# Patient Record
Sex: FEMALE | Race: WHITE | NOT HISPANIC OR LATINO | Employment: FULL TIME | URBAN - METROPOLITAN AREA
[De-identification: names, ages, dates, MRNs, and addresses within clinical notes are randomized per-mention and may not be internally consistent; named-entity substitution may affect disease eponyms.]

---

## 2021-03-02 ENCOUNTER — TRANSFERRED RECORDS (OUTPATIENT)
Dept: MULTI SPECIALTY CLINIC | Facility: CLINIC | Age: 41
End: 2021-03-02

## 2021-03-02 LAB
HPV ABSTRACT: NORMAL
PAP-ABSTRACT: NORMAL

## 2023-08-30 ENCOUNTER — TELEPHONE (OUTPATIENT)
Dept: FAMILY MEDICINE | Facility: CLINIC | Age: 43
End: 2023-08-30
Payer: COMMERCIAL

## 2023-08-30 NOTE — TELEPHONE ENCOUNTER
Reason for Call:  Appointment Request    Patient requesting this type of appt:  Hospital/ED Follow-Up     Requested provider:  Geraldine Browning    Reason patient unable to be scheduled: Not within requested timeframe    When does patient want to be seen/preferred time: 1-2 weeks    Comments: Patient use to be a patient of Geraldine's when she worked at [employer] and she is looking to establish care again. Patient was recently discharged to [place] from [place] due to insurance reason's and was told to follow up with her PcP within a month. Patient can't get in to see Geraldine till January and she is requesting to be worked in sooner    Okay to leave a detailed message?: Yes at Home number on file [phone] (home)    Call taken on 8/30/2023 at 1:25 PM by Mary Davis

## 2023-08-30 NOTE — TELEPHONE ENCOUNTER
Please offer pt any same day appts. OK to let her know I am expecting and will be out of office for Oct-Dec for maternity leave, so my schedule is tight before my due date  Geraldine Good PA-C

## 2023-08-30 NOTE — TELEPHONE ENCOUNTER
LVM regarding below message and advised to connect with care team to schedule utilizing PCP message below-

## 2023-09-01 ENCOUNTER — OFFICE VISIT (OUTPATIENT)
Dept: FAMILY MEDICINE | Facility: CLINIC | Age: 43
End: 2023-09-01
Payer: COMMERCIAL

## 2023-09-01 VITALS
SYSTOLIC BLOOD PRESSURE: 133 MMHG | DIASTOLIC BLOOD PRESSURE: 86 MMHG | TEMPERATURE: 97.2 F | BODY MASS INDEX: 27.68 KG/M2 | RESPIRATION RATE: 16 BRPM | HEART RATE: 74 BPM | HEIGHT: 63 IN | OXYGEN SATURATION: 99 % | WEIGHT: 156.2 LBS

## 2023-09-01 DIAGNOSIS — F33.2 SEVERE EPISODE OF RECURRENT MAJOR DEPRESSIVE DISORDER, WITHOUT PSYCHOTIC FEATURES (H): ICD-10-CM

## 2023-09-01 DIAGNOSIS — F63.3 TRICHOTILLOMANIA: ICD-10-CM

## 2023-09-01 DIAGNOSIS — F10.11 MILD ALCOHOL ABUSE IN EARLY REMISSION: Primary | ICD-10-CM

## 2023-09-01 DIAGNOSIS — D64.9 ANEMIA, UNSPECIFIED TYPE: ICD-10-CM

## 2023-09-01 DIAGNOSIS — L30.9 DERMATITIS: ICD-10-CM

## 2023-09-01 DIAGNOSIS — J30.2 SEASONAL ALLERGIC RHINITIS, UNSPECIFIED TRIGGER: ICD-10-CM

## 2023-09-01 PROBLEM — F41.9 ANXIETY: Status: ACTIVE | Noted: 2021-03-11

## 2023-09-01 PROBLEM — R79.89 ELEVATED LIVER FUNCTION TESTS: Status: ACTIVE | Noted: 2021-03-11

## 2023-09-01 PROBLEM — E78.00 HYPERCHOLESTEROLEMIA: Status: ACTIVE | Noted: 2021-03-11

## 2023-09-01 PROBLEM — R03.0 ELEVATED BLOOD PRESSURE READING WITHOUT DIAGNOSIS OF HYPERTENSION: Status: ACTIVE | Noted: 2021-03-11

## 2023-09-01 LAB
ALBUMIN SERPL BCG-MCNC: 4.2 G/DL (ref 3.5–5.2)
ALP SERPL-CCNC: 43 U/L (ref 35–104)
ALT SERPL W P-5'-P-CCNC: 64 U/L (ref 0–50)
AST SERPL W P-5'-P-CCNC: 80 U/L (ref 0–45)
BILIRUB DIRECT SERPL-MCNC: <0.2 MG/DL (ref 0–0.3)
BILIRUB SERPL-MCNC: 0.2 MG/DL
ERYTHROCYTE [DISTWIDTH] IN BLOOD BY AUTOMATED COUNT: 18 % (ref 10–15)
FERRITIN SERPL-MCNC: 15 NG/ML (ref 6–175)
HCT VFR BLD AUTO: 33 % (ref 35–47)
HGB BLD-MCNC: 9.5 G/DL (ref 11.7–15.7)
IRON BINDING CAPACITY (ROCHE): 387 UG/DL (ref 240–430)
IRON SATN MFR SERPL: 3 % (ref 15–46)
IRON SERPL-MCNC: 12 UG/DL (ref 37–145)
MCH RBC QN AUTO: 20 PG (ref 26.5–33)
MCHC RBC AUTO-ENTMCNC: 28.8 G/DL (ref 31.5–36.5)
MCV RBC AUTO: 69 FL (ref 78–100)
PLATELET # BLD AUTO: 500 10E3/UL (ref 150–450)
PROT SERPL-MCNC: 7.4 G/DL (ref 6.4–8.3)
RBC # BLD AUTO: 4.76 10E6/UL (ref 3.8–5.2)
WBC # BLD AUTO: 6.2 10E3/UL (ref 4–11)

## 2023-09-01 PROCEDURE — 80076 HEPATIC FUNCTION PANEL: CPT | Performed by: PHYSICIAN ASSISTANT

## 2023-09-01 PROCEDURE — 82728 ASSAY OF FERRITIN: CPT | Performed by: PHYSICIAN ASSISTANT

## 2023-09-01 PROCEDURE — 83550 IRON BINDING TEST: CPT | Performed by: PHYSICIAN ASSISTANT

## 2023-09-01 PROCEDURE — 36415 COLL VENOUS BLD VENIPUNCTURE: CPT | Performed by: PHYSICIAN ASSISTANT

## 2023-09-01 PROCEDURE — 99214 OFFICE O/P EST MOD 30 MIN: CPT | Performed by: PHYSICIAN ASSISTANT

## 2023-09-01 PROCEDURE — 85027 COMPLETE CBC AUTOMATED: CPT | Performed by: PHYSICIAN ASSISTANT

## 2023-09-01 PROCEDURE — 83540 ASSAY OF IRON: CPT | Performed by: PHYSICIAN ASSISTANT

## 2023-09-01 RX ORDER — NALTREXONE HYDROCHLORIDE 50 MG/1
50 TABLET, FILM COATED ORAL DAILY
Qty: 90 TABLET | Refills: 3 | Status: SHIPPED | OUTPATIENT
Start: 2023-09-01 | End: 2024-08-22

## 2023-09-01 RX ORDER — FLUOXETINE 10 MG/1
CAPSULE ORAL
Qty: 49 CAPSULE | Refills: 0 | Status: SHIPPED | OUTPATIENT
Start: 2023-09-01 | End: 2023-12-15

## 2023-09-01 RX ORDER — ARIPIPRAZOLE 2 MG/1
1 TABLET ORAL DAILY
COMMUNITY
Start: 2023-03-14 | End: 2023-09-01

## 2023-09-01 RX ORDER — ARIPIPRAZOLE 2 MG/1
2 TABLET ORAL DAILY
Qty: 90 TABLET | Refills: 1 | Status: SHIPPED | OUTPATIENT
Start: 2023-09-01

## 2023-09-01 RX ORDER — TRETINOIN 0.1 MG/G
GEL TOPICAL
COMMUNITY
Start: 2022-11-09 | End: 2023-09-01

## 2023-09-01 RX ORDER — NALTREXONE HYDROCHLORIDE 50 MG/1
50 TABLET, FILM COATED ORAL
COMMUNITY
Start: 2023-03-10 | End: 2023-09-01

## 2023-09-01 RX ORDER — TRIAMCINOLONE ACETONIDE 1 MG/G
OINTMENT TOPICAL 2 TIMES DAILY
Qty: 80 G | Refills: 3 | Status: SHIPPED | OUTPATIENT
Start: 2023-09-01

## 2023-09-01 RX ORDER — TRETINOIN 0.1 MG/G
GEL TOPICAL AT BEDTIME
Qty: 45 G | Refills: 4 | Status: SHIPPED | OUTPATIENT
Start: 2023-09-01 | End: 2023-12-15

## 2023-09-01 RX ORDER — TRIAMCINOLONE ACETONIDE 1 MG/G
OINTMENT TOPICAL
COMMUNITY
Start: 2022-08-26 | End: 2023-09-01

## 2023-09-01 RX ORDER — TRAZODONE HYDROCHLORIDE 50 MG/1
50 TABLET, FILM COATED ORAL
Qty: 90 TABLET | Refills: 1 | Status: SHIPPED | OUTPATIENT
Start: 2023-09-01

## 2023-09-01 RX ORDER — TRAZODONE HYDROCHLORIDE 50 MG/1
1 TABLET, FILM COATED ORAL
COMMUNITY
Start: 2022-04-21 | End: 2023-09-01

## 2023-09-01 RX ORDER — CETIRIZINE HYDROCHLORIDE 10 MG/1
10 TABLET ORAL DAILY
COMMUNITY
Start: 2022-06-30 | End: 2023-09-01

## 2023-09-01 RX ORDER — CETIRIZINE HYDROCHLORIDE 10 MG/1
10 TABLET ORAL DAILY
Qty: 90 TABLET | Refills: 3 | Status: SHIPPED | OUTPATIENT
Start: 2023-09-01 | End: 2023-12-15

## 2023-09-01 ASSESSMENT — PAIN SCALES - GENERAL: PAINLEVEL: NO PAIN (0)

## 2023-09-01 NOTE — PATIENT INSTRUCTIONS
Week 1: take 25mg of sertraline (1/2 tablet), and take 1 capsule fluoxetine  Week 2: stop sertraline and increase fluoxetine to 2 capsules/day

## 2023-09-01 NOTE — PROGRESS NOTES
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 She will continue with IOP.  - REVIEW OF HEALTH MAINTENANCE PROTOCOL ORDERS  - ARIPiprazole (ABILIFY) 2 MG tablet  Dispense: 90 tablet; Refill: 1  - naltrexone (DEPADE/REVIA) 50 MG tablet  Dispense: 90 tablet; Refill: 3  - Hepatic panel (Albumin, ALT, AST, Bili, Alk Phos, TP)  - Hepatic panel (Albumin, ALT, AST, Bili, Alk Phos, TP)    Severe episode of recurrent major depressive disorder, without psychotic features (H) - stable, meds refilled. She has upcoming appt with psychiatry 9/21, so they will take over prescribing. We will d/c sertraline gradually - decrease to 25mg for 1 week then stop all together week two. Same time we will swtich to fluoxetine, start week 1 with 10mg daily then increase to 20mg daily.   - ARIPiprazole (ABILIFY) 2 MG tablet  Dispense: 90 tablet; Refill: 1  - traZODone (DESYREL) 50 MG tablet  Dispense: 90 tablet; Refill: 1  - FLUoxetine (PROZAC) 10 MG capsule  Dispense: 49 capsule; Refill: 0    Trichotillomania - refills provided  - tretinoin (RETIN-A) 0.01 % external gel  Dispense: 45 g; Refill: 4    Seasonal allergic rhinitis, unspecified trigger - refills provided  - cetirizine (ZYRTEC) 10 MG tablet  Dispense: 90 tablet; Refill: 3    Dermatitis - refills provided  - triamcinolone (KENALOG) 0.1 % external ointment  Dispense: 80 g; Refill: 3    Anemia, unspecified type - recheck to see if ongoing need for iron supplement  - CBC with platelets  - Iron and iron binding capacity  - Ferritin  - CBC with platelets  - Iron and iron binding capacity  - Ferritin    MED REC REQUIRED  Post Medication Reconciliation Status:  Discharge medications reconciled and changed, see notes/orders    This note was generated with [employer], and has been reviewed personally for accuracy. Some pronoun/word replacements may be made unintentionally.     Geraldine Good PA-C  [facility]  "[facility]      Subjective   Priscilla is a 42 year old, presenting for the following health issues:  Follow Up (Blood work /Medication )      9/1/2023     9:57 AM   Additional Questions   Roomed by Dayna SPRING   Accompanied by self     Priscilla is a well known patient to me, previously followed at [facility]    She is following up/reestablishing care following inpatient treatment at [facility]. Admitted on 8/14, discharged on 8/29. She was not able to see a psychiatrist until the day before her discharge. Her psychiatrist restarted Abilify 2mg. Psychiatry recommended switch from sertraline to fluoxetine but did not make the change while inpatient - she has outpt follow up on 9/21. She is not taking trazodone, but she still has some in case she needs it. She has started taking the naltrexone 50 mg again. She is on a 30-day regimen of iron right now because her hemoglobin was really low. She is on Retin-A for scarring. It has been helping.     She is doing evening [employer] 4 nights a week now. She works from 6:00 AM to 2:30 PM or 3:00 PM. She will have a couple of hours when she gets home to hang out with a friend, take her dog for a walk, or go for a bike ride. By the time she goes to [facility], the liquor stores are closed. She is excited when they told her to get a free breathalyzer while she is in treatment. Her biggest triggers are isolation, boredom, and loneliness.       HPI       Review of Systems         Objective    /86 (BP Location: Right arm, Patient Position: Sitting, Cuff Size: Adult Regular)   Pulse 74   Temp 97.2  F (36.2  C) (Temporal)   Resp 16   Ht 1.6 m (5' 3\")   Wt 70.9 kg (156 lb 3.2 oz)   LMP 08/11/2023 (Approximate)   SpO2 99%   BMI 27.67 kg/m    Body mass index is 27.67 kg/m .  Physical Exam   GENERAL: healthy, alert and no distress  PSYCH: mentation appears normal, affect normal/bright      "

## 2023-09-01 NOTE — Clinical Note
Other Tests found in the patient's chart through Chart Review/Care Everywhere:  Pap smear done by [facility] on this date: 3/2/21  Note to Abstraction: If this section is blank, no results were found via Chart Review/Care Everywhere.

## 2023-09-27 ENCOUNTER — LAB (OUTPATIENT)
Dept: LAB | Facility: CLINIC | Age: 43
End: 2023-09-27
Payer: COMMERCIAL

## 2023-09-27 DIAGNOSIS — D64.9 ANEMIA, UNSPECIFIED TYPE: ICD-10-CM

## 2023-09-27 DIAGNOSIS — F10.11 MILD ALCOHOL ABUSE IN EARLY REMISSION: ICD-10-CM

## 2023-09-27 LAB
ALBUMIN SERPL BCG-MCNC: 4.2 G/DL (ref 3.5–5.2)
ALP SERPL-CCNC: 46 U/L (ref 35–104)
ALT SERPL W P-5'-P-CCNC: 20 U/L (ref 0–50)
AST SERPL W P-5'-P-CCNC: 33 U/L (ref 0–45)
BILIRUB DIRECT SERPL-MCNC: <0.2 MG/DL (ref 0–0.3)
BILIRUB SERPL-MCNC: 0.2 MG/DL
ERYTHROCYTE [DISTWIDTH] IN BLOOD BY AUTOMATED COUNT: 17.7 % (ref 10–15)
HCT VFR BLD AUTO: 32.1 % (ref 35–47)
HGB BLD-MCNC: 9.2 G/DL (ref 11.7–15.7)
MCH RBC QN AUTO: 19.5 PG (ref 26.5–33)
MCHC RBC AUTO-ENTMCNC: 28.7 G/DL (ref 31.5–36.5)
MCV RBC AUTO: 68 FL (ref 78–100)
PLATELET # BLD AUTO: 330 10E3/UL (ref 150–450)
PROT SERPL-MCNC: 7.3 G/DL (ref 6.4–8.3)
RBC # BLD AUTO: 4.72 10E6/UL (ref 3.8–5.2)
WBC # BLD AUTO: 6.3 10E3/UL (ref 4–11)

## 2023-09-27 PROCEDURE — 85027 COMPLETE CBC AUTOMATED: CPT

## 2023-09-27 PROCEDURE — 80076 HEPATIC FUNCTION PANEL: CPT

## 2023-09-27 PROCEDURE — 36415 COLL VENOUS BLD VENIPUNCTURE: CPT

## 2023-09-29 ENCOUNTER — MYC MEDICAL ADVICE (OUTPATIENT)
Dept: FAMILY MEDICINE | Facility: CLINIC | Age: 43
End: 2023-09-29
Payer: COMMERCIAL

## 2023-09-29 DIAGNOSIS — D50.0 IRON DEFICIENCY ANEMIA DUE TO CHRONIC BLOOD LOSS: Primary | ICD-10-CM

## 2023-09-29 DIAGNOSIS — K64.9 BLEEDING HEMORRHOIDS: ICD-10-CM

## 2023-09-29 DIAGNOSIS — F63.3 TRICHOTILLOMANIA: ICD-10-CM

## 2023-09-29 NOTE — TELEPHONE ENCOUNTER
Geraldine,    Patient states has been taking 325 mg iron every other day and has missed only a day or two. Does note bright red blood in stool as well as very light periods.    Litzy Gonzalez, RN, BSN, PHN  [facility]  [phone]

## 2023-10-03 RX ORDER — HYDROCORTISONE ACETATE 25 MG/1
25 SUPPOSITORY RECTAL 2 TIMES DAILY
Qty: 60 SUPPOSITORY | Refills: 0 | Status: SHIPPED | OUTPATIENT
Start: 2023-10-03 | End: 2023-12-15

## 2023-10-10 RX ORDER — HYDROCORTISONE 25 MG/G
CREAM TOPICAL 2 TIMES DAILY PRN
Status: CANCELLED | OUTPATIENT
Start: 2023-10-10

## 2023-10-19 ENCOUNTER — MYC REFILL (OUTPATIENT)
Dept: FAMILY MEDICINE | Facility: CLINIC | Age: 43
End: 2023-10-19
Payer: COMMERCIAL

## 2023-10-19 DIAGNOSIS — F33.2 SEVERE EPISODE OF RECURRENT MAJOR DEPRESSIVE DISORDER, WITHOUT PSYCHOTIC FEATURES (H): ICD-10-CM

## 2023-10-19 RX ORDER — FLUOXETINE 10 MG/1
CAPSULE ORAL
Qty: 49 CAPSULE | Refills: 0 | Status: CANCELLED | OUTPATIENT
Start: 2023-10-19 | End: 2023-11-15

## 2023-10-20 NOTE — TELEPHONE ENCOUNTER
Per note from concepcion in her pcp in sept noted Severe episode of recurrent major depressive disorder, without psychotic features (H) - stable, meds refilled. She has upcoming appt with psychiatry 9/21, so they will take over prescribing. We will d/c sertraline gradually - decrease to 25mg for 1 week then stop all together week two. Same time we will swtich to fluoxetine, start week 1 with 10mg daily then increase to 20mg daily.    Please have her discuss all her mental helath meds now with her psychiatrist per pcp they were to take over prescribing

## 2023-10-27 ENCOUNTER — IMMUNIZATION (OUTPATIENT)
Dept: FAMILY MEDICINE | Facility: CLINIC | Age: 43
End: 2023-10-27
Payer: COMMERCIAL

## 2023-10-27 ENCOUNTER — LAB (OUTPATIENT)
Dept: LAB | Facility: CLINIC | Age: 43
End: 2023-10-27
Payer: COMMERCIAL

## 2023-10-27 DIAGNOSIS — D50.0 IRON DEFICIENCY ANEMIA DUE TO CHRONIC BLOOD LOSS: ICD-10-CM

## 2023-10-27 LAB
ERYTHROCYTE [DISTWIDTH] IN BLOOD BY AUTOMATED COUNT: 22.5 % (ref 10–15)
HCT VFR BLD AUTO: 37.1 % (ref 35–47)
HGB BLD-MCNC: 10.6 G/DL (ref 11.7–15.7)
MCH RBC QN AUTO: 20.8 PG (ref 26.5–33)
MCHC RBC AUTO-ENTMCNC: 28.6 G/DL (ref 31.5–36.5)
MCV RBC AUTO: 73 FL (ref 78–100)
PLATELET # BLD AUTO: 323 10E3/UL (ref 150–450)
RBC # BLD AUTO: 5.09 10E6/UL (ref 3.8–5.2)
WBC # BLD AUTO: 7.2 10E3/UL (ref 4–11)

## 2023-10-27 PROCEDURE — 36415 COLL VENOUS BLD VENIPUNCTURE: CPT

## 2023-10-27 PROCEDURE — 90471 IMMUNIZATION ADMIN: CPT

## 2023-10-27 PROCEDURE — 90686 IIV4 VACC NO PRSV 0.5 ML IM: CPT

## 2023-10-27 PROCEDURE — 85027 COMPLETE CBC AUTOMATED: CPT

## 2023-11-05 ENCOUNTER — HEALTH MAINTENANCE LETTER (OUTPATIENT)
Age: 43
End: 2023-11-05

## 2023-12-08 ASSESSMENT — ENCOUNTER SYMPTOMS
NAUSEA: 1
SHORTNESS OF BREATH: 0
FEVER: 0
NERVOUS/ANXIOUS: 1
EYE PAIN: 0
DIZZINESS: 1
HEADACHES: 0
HEMATURIA: 0
PALPITATIONS: 0
COUGH: 0
ABDOMINAL PAIN: 1
PARESTHESIAS: 1
FREQUENCY: 0
MYALGIAS: 1
HEMATOCHEZIA: 1
DYSURIA: 0
HEARTBURN: 0
BREAST MASS: 0
DIARRHEA: 0
CHILLS: 0
ARTHRALGIAS: 0
SORE THROAT: 0
WEAKNESS: 0
CONSTIPATION: 0
JOINT SWELLING: 0

## 2023-12-08 NOTE — COMMUNITY RESOURCES LIST (ENGLISH)
12/08/2023   [facility]  N/A  For questions about this resource list or additional care needs, please contact your primary care clinic or care manager.  Phone: [phone]   Email: N/A   Address: [address] [place] [address]   Hours: N/A        Financial Stability       Rent and mortgage payment assistance  1  [facility] Distance: 0.18 miles      Phone/Virtual   [address] [place] [address]  Language: English  Hours: Mon - Fri 9:00 AM - 4:00 PM  Fees: Free   Phone: (166) 467-8835 Email: johnathon@[facility] HospitalSymtext.n1health Website: http://Mediameeting.org/[facility]/rz-gooi-dwuzmkendalle/     2  ong [employer] ([facility]) - [place] Office - Supportive Housing Assistance Program (SHAP) - Rent and mortgage payment assistance Distance: 0.27 miles      Phone/Virtual   [address] [place] [address]  Language: English, Hmong, Isis, Yemeni  Hours: Mon - Fri 8:30 AM - 5:00 PM  Fees: Free   Phone: (499) 253-5035 Email: shayy@Roomtag.n1health Website: http://www.Roomtag.org/[facility]-impact-areas/     Utility payment assistance  3  [facility] Distance: 0.18 miles      Phone/Virtual   [address] [place] [address]  Language: English  Hours: Mon - Fri 9:00 AM - 4:00 PM  Fees: Free   Phone: (698) 997-2613 Email: johnathon@[facility] Lake PointeCaseReader.n1health Website: http://Mediameeting.org/[facility]/qc-zdkx-fwcqcpepe/     4  ong [employer] ([facility]) - [place] Office - Supportive Housing Assistance Program (SHAP) - Utility payment assistance Distance: 0.27 miles      Phone/Virtual   [address] [place] [address]  Language: English, Hmong, Isis, Yemeni  Hours: Mon - Fri 8:30 AM - 5:00 PM  Fees: Free   Phone: (558) 353-4668 Email: shayy@Roomtag.org Website: http://www.hmong.org/hap-impact-areas/          Food and Nutrition       Food pantry  5  [facility]  [facility] Distance: 0.18 miles      [facility]   [address] [place] [address]  Language: English  Hours: Mon - Tue 9:00 AM - 3:00 PM  Fees: Free, Self Pay   Phone: (839) 406-6695 Email: johnathon@[facility] Website: http://[facility].org/[facility]/     6  [facility] Distance: 0.24 miles      Pickup   [address] [place] [address]  Language: English  Hours: Mon - Fri 12:00 PM - 1:00 PM  Fees: Free   Phone: (667) 962-2742 Email: info@[facility].[employer] Website: https://www.[facility].org/locations/[facility]_ymca     SNAP application assistance  7  [facility] Distance: 0.18 miles      Phone/Virtual   [address] [place] [address]  Language: English  Hours: Mon - Thu 9:00 AM - 4:00 PM , Fri 9:00 AM - 2:00 PM , Sun 10:00 AM - 12:00 PM  Fees: Free   Phone: (366) 644-4252 Email: johnathon@[facility] Website: http://[facility].org/[facility]/     8  [employer] ([facility]greenHealth Medical Center Distance: 0.63 miles      In-Person   [address] [place] [address]  Language: English, Yakut  Hours: Mon - Fri 8:30 AM - 5:00 PM  Fees: Free   Phone: (374) 911-7093 Email: info@[employer].org Website: http://www.[employer].org     Soup kitchen or free meals  9  [facility] Distance: 0.18 miles      [place] [id number]  Language: English  Hours: Mon - Fri 11:45 AM - 12:45 PM  Fees: Free   Phone: (479) 499-8282 Email: johnathon@[facility].salvationarmy.org Website: http://Adams-Nervine Asylumynorth.org/community/iu-wnsx-rnrch-sally/     10  [facility] - Loaves and Fishes Distance: 1.98 miles      [facility]   [address] [place] [address]  Language: English  Hours: Wed 5:30 PM - 6:30 PM  Fees: Free   Phone: (216) 819-4851 Email:  office@orFreeman Cancer Institute.org Website: https://www.[facility].org          Transportation       Free or low-cost transportation  11  [employer] - [place] Circulator Bus Distance: 5.16 miles      In-Person   [address] [place] [address]  Language: English  Hours: Tue 9:00 AM - 2:00 PM  Fees: Self Pay   Phone: (812) 978-3917 Email: info@Soocial Website: http://www.YouFastUnlockorg/     12  [employer] Bus Loop - Free or low-cost transportation Distance: 5.96 miles      In-Person   [address] [place] [address]  Language: English  Hours: Mon - Fri 9:00 AM - 5:00 PM  Fees: Free   Phone: (427) 722-1937 Email: info@Eye-Q Website: https://www.Eye-Q/     Transportation to medical appointments  13  [employer] [facility] Transportation - Non-Emergency Medical Transportation Distance: 2.72 miles      In-Person   [address] [place] [address]  Language: English  Hours: Mon - Fri 8:00 AM - 4:00 PM Appt. Only  Fees: Self Pay   Phone: (487) 648-9226 Website: http://www.allHD Trade Services.org/Medical-Services/Emergency-medical-services/Non-emergency-transportation/     14  [employer] Ride Distance: 3.31 miles      In-Person   [address] [address]  Language: English  Hours: Mon - Thu 6:00 AM - 6:00 PM , Fri 6:00 AM - 5:00 PM  Fees: Insurance, Self Pay   Phone: (921) 311-2674 Email: office@Bellmetric Website: https://www.Bellmetric/          Important Numbers & Websites       Emergency Services   911  City Services   311  Poison Control   (351) 773-7697  Suicide Prevention Lifeline   (798) 100-7527 (TALK)  Child Abuse Hotline   (902) 994-8932 (4-A-Child)  Sexual Assault Hotline   (775) 879-9059 (HOPE)  National Runaway Safeline   (546) 483-5656 (RUNAWAY)  All-Options Talkline   (869) 507-3547  Substance Abuse Referral   (460) 310-9158 (HELP)

## 2023-12-14 ASSESSMENT — PATIENT HEALTH QUESTIONNAIRE - PHQ9
SUM OF ALL RESPONSES TO PHQ QUESTIONS 1-9: 3
SUM OF ALL RESPONSES TO PHQ QUESTIONS 1-9: 3
10. IF YOU CHECKED OFF ANY PROBLEMS, HOW DIFFICULT HAVE THESE PROBLEMS MADE IT FOR YOU TO DO YOUR WORK, TAKE CARE OF THINGS AT HOME, OR GET ALONG WITH OTHER PEOPLE: SOMEWHAT DIFFICULT

## 2023-12-14 NOTE — COMMUNITY RESOURCES LIST (ENGLISH)
12/14/2023   [facility]  N/A  For questions about this resource list or additional care needs, please contact your primary care clinic or care manager.  Phone: [phone]   Email: N/A   Address: [address] [place] [address]   Hours: N/A        Financial Stability       Rent and mortgage payment assistance  1  [facility] Distance: 0.18 miles      Phone/Virtual   [address] [place] [address]  Language: English  Hours: Mon - Fri 9:00 AM - 4:00 PM  Fees: Free   Phone: (969) 220-8117 Email: johnathon@[facility] HospitalSolar Nation.[employer] Website: http://Summay.org/[facility]/cm-iuwk-dbocmkendalle/     2  ong American Partnership ([facility]) - [place] Office - Supportive Housing Assistance Program (SHAP) - Rent and mortgage payment assistance Distance: 0.27 miles      Phone/Virtual   [address] [place] [address]  Language: English, Hmong, Isis, Welsh  Hours: Mon - Fri 8:30 AM - 5:00 PM  Fees: Free   Phone: (672) 352-1914 Email: shayy@[employer] Website: http://www.Kleen Extreme.org/[facility]-impact-areas/     Utility payment assistance  3  [facility] Distance: 0.18 miles      Phone/Virtual   [address] [place] [address]  Language: English  Hours: Mon - Fri 9:00 AM - 4:00 PM  Fees: Free   Phone: (744) 302-6559 Email: johnathon@[facility]'s Medical CenterMela Artisans.Bolt HR Website: http://Summay.org/[facility]/tg-bkiw-iouhgpepe/     4  ong [employer] ([facility]) - [place] Office - Supportive Housing Assistance Program (SHAP) - Utility payment assistance Distance: 0.27 miles      Phone/Virtual   [address] [place] [address]  Language: English, Hmong, Isis, Welsh  Hours: Mon - Fri 8:30 AM - 5:00 PM  Fees: Free   Phone: (828) 517-6847 Email: shayy@K[employer] Extreme.org Website: http://www.hmong.org/hap-impact-areas/          Food and Nutrition       Food pantry  5  [facility]  [facility] Distance: 0.18 miles      [facility]   [address] [place] [address]  Language: English  Hours: Mon - Tue 9:00 AM - 3:00 PM  Fees: Free, Self Pay   Phone: (404) 656-5474 Email: johnathon@[facility] Website: http://[facility].org/[facility]/     6  [facility] the [facility] Distance: 0.24 miles      Pickup   [address] [place] [address]  Language: English  Hours: Mon - Fri 12:00 PM - 1:00 PM  Fees: Free   Phone: (485) 403-2003 Email: info@[facility].[employer] Website: https://www.[facility].org/locations/[facility]_ymca     SNAP application assistance  7  [facility] Distance: 0.18 miles      Phone/Virtual   [address] [place] [address]  Language: English  Hours: Mon - Thu 9:00 AM - 4:00 PM , Fri 9:00 AM - 2:00 PM , Sun 10:00 AM - 12:00 PM  Fees: Free   Phone: (610) 719-8067 Email: johnathon@[facility] Website: http://[facility].org/[facility]/     8  Comunidades Latinas Unidas En Servicio ([facility] Distance: 0.63 miles      In-Person   [address] [place] [address]  Language: English, Yi  Hours: Mon - Fri 8:30 AM - 5:00 PM  Fees: Free   Phone: (384) 503-9361 Email: info@[employer].org Website: http://www.[employer]N Systems.org     Soup kitchen or free meals  9  [facility] Distance: 0.18 miles      [place] [id number]  Language: English  Hours: Mon - Fri 11:45 AM - 12:45 PM  Fees: Free   Phone: (648) 699-5079 Email: johnathon@[facility].salvationarmy.org Website: http://[facility] Tub[facility] Hospitalynorth.org/community/il-gioe-cfoij-sally/     10  [facility] Andrew ScientologistFirstHealth [facility] - Loaves and Fishes - Loaves and Fishes Distance: 1.98 miles      [facility]   [address] [place] [address]  Language: English  Hours: Wed 5:30 PM - 6:30 PM  Fees: Free   Phone: (111) 520-3923 Email:  office@orThe Rehabilitation Institute.org Website: https://www.[facility].org          Transportation       Free or low-cost transportation  11  [employer] - [place] Circulator Bus Distance: 5.16 miles      In-Person   [address] [place] [address]  Language: English  Hours: Tue 9:00 AM - 2:00 PM  Fees: Self Pay   Phone: (994) 103-5329 Email: info@[employer] Website: http://www.Paper.liorg/     12  [employer] Bus Loop - Free or low-cost transportation Distance: 5.96 miles      In-Person   [address] [place] [address]  Language: English  Hours: Mon - Fri 9:00 AM - 5:00 PM  Fees: Free   Phone: (574) 956-1733 Email: info@Ontela Website: https://www.Ontela/     Transportation to medical appointments  13  [employer] Medical Transportation - Non-Emergency Medical Transportation Distance: 2.72 miles      In-Person   [address] [place] [address]  Language: English  Hours: Mon - Fri 8:00 AM - 4:00 PM Appt. Only  Fees: Self Pay   Phone: (704) 549-5254 Website: http://www.allEnchanted Diamonds.org/Medical-Services/Emergency-medical-services/Non-emergency-transportation/     14  [employer] Ride Distance: 3.31 miles      In-Person   [address] [address]  Language: English  Hours: Mon - Thu 6:00 AM - 6:00 PM , Fri 6:00 AM - 5:00 PM  Fees: Insurance, Self Pay   Phone: (226) 681-2531 Email: office@Cellartis Website: https://www.Cellartis/          Important Numbers & Websites       Emergency Services   911  City Services   311  Poison Control   (287) 472-2885  Suicide Prevention Lifeline   (194) 397-3805 (TALK)  Child Abuse Hotline   (469) 998-6896 (4-A-Child)  Sexual Assault Hotline   (532) 858-3520 (HOPE)  National Runaway Safeline   (404) 392-7347 (RUNAWAY)  All-Options Talkline   (491) 462-3260  Substance Abuse Referral   (797) 353-5891 (HELP)

## 2023-12-15 ENCOUNTER — OFFICE VISIT (OUTPATIENT)
Dept: FAMILY MEDICINE | Facility: CLINIC | Age: 43
End: 2023-12-15
Payer: COMMERCIAL

## 2023-12-15 VITALS
RESPIRATION RATE: 16 BRPM | TEMPERATURE: 97.8 F | WEIGHT: 156.7 LBS | HEART RATE: 82 BPM | HEIGHT: 63 IN | OXYGEN SATURATION: 98 % | SYSTOLIC BLOOD PRESSURE: 122 MMHG | DIASTOLIC BLOOD PRESSURE: 78 MMHG | BODY MASS INDEX: 27.77 KG/M2

## 2023-12-15 DIAGNOSIS — Z00.00 ROUTINE GENERAL MEDICAL EXAMINATION AT A HEALTH CARE FACILITY: Primary | ICD-10-CM

## 2023-12-15 DIAGNOSIS — D50.0 IRON DEFICIENCY ANEMIA DUE TO CHRONIC BLOOD LOSS: ICD-10-CM

## 2023-12-15 DIAGNOSIS — Z51.81 ENCOUNTER FOR THERAPEUTIC DRUG MONITORING: ICD-10-CM

## 2023-12-15 DIAGNOSIS — F10.11 MILD ALCOHOL ABUSE IN EARLY REMISSION: ICD-10-CM

## 2023-12-15 DIAGNOSIS — E78.00 HYPERCHOLESTEROLEMIA: ICD-10-CM

## 2023-12-15 DIAGNOSIS — Z12.31 ENCOUNTER FOR SCREENING MAMMOGRAM FOR MALIGNANT NEOPLASM OF BREAST: ICD-10-CM

## 2023-12-15 DIAGNOSIS — F33.2 SEVERE EPISODE OF RECURRENT MAJOR DEPRESSIVE DISORDER, WITHOUT PSYCHOTIC FEATURES (H): ICD-10-CM

## 2023-12-15 LAB
ALBUMIN SERPL BCG-MCNC: 4.5 G/DL (ref 3.5–5.2)
ALP SERPL-CCNC: 45 U/L (ref 40–150)
ALT SERPL W P-5'-P-CCNC: 12 U/L (ref 0–50)
ANION GAP SERPL CALCULATED.3IONS-SCNC: 12 MMOL/L (ref 7–15)
AST SERPL W P-5'-P-CCNC: 20 U/L (ref 0–45)
BILIRUB SERPL-MCNC: 0.2 MG/DL
BUN SERPL-MCNC: 6.3 MG/DL (ref 6–20)
CALCIUM SERPL-MCNC: 9.3 MG/DL (ref 8.6–10)
CHLORIDE SERPL-SCNC: 106 MMOL/L (ref 98–107)
CHOLEST SERPL-MCNC: 233 MG/DL
CREAT SERPL-MCNC: 0.54 MG/DL (ref 0.51–0.95)
DEPRECATED HCO3 PLAS-SCNC: 21 MMOL/L (ref 22–29)
EGFRCR SERPLBLD CKD-EPI 2021: >90 ML/MIN/1.73M2
FASTING STATUS PATIENT QL REPORTED: NO
FERRITIN SERPL-MCNC: 29 NG/ML (ref 6–175)
GLUCOSE SERPL-MCNC: 70 MG/DL (ref 70–99)
HDLC SERPL-MCNC: 64 MG/DL
HGB BLD-MCNC: 12.5 G/DL (ref 11.7–15.7)
LDLC SERPL CALC-MCNC: 149 MG/DL
NONHDLC SERPL-MCNC: 169 MG/DL
POTASSIUM SERPL-SCNC: 3.8 MMOL/L (ref 3.4–5.3)
PROT SERPL-MCNC: 7.2 G/DL (ref 6.4–8.3)
SODIUM SERPL-SCNC: 139 MMOL/L (ref 135–145)
TRIGL SERPL-MCNC: 101 MG/DL

## 2023-12-15 PROCEDURE — 36415 COLL VENOUS BLD VENIPUNCTURE: CPT | Performed by: STUDENT IN AN ORGANIZED HEALTH CARE EDUCATION/TRAINING PROGRAM

## 2023-12-15 PROCEDURE — 80053 COMPREHEN METABOLIC PANEL: CPT | Performed by: STUDENT IN AN ORGANIZED HEALTH CARE EDUCATION/TRAINING PROGRAM

## 2023-12-15 PROCEDURE — 99213 OFFICE O/P EST LOW 20 MIN: CPT | Mod: 25 | Performed by: STUDENT IN AN ORGANIZED HEALTH CARE EDUCATION/TRAINING PROGRAM

## 2023-12-15 PROCEDURE — 85018 HEMOGLOBIN: CPT | Performed by: STUDENT IN AN ORGANIZED HEALTH CARE EDUCATION/TRAINING PROGRAM

## 2023-12-15 PROCEDURE — 99396 PREV VISIT EST AGE 40-64: CPT | Performed by: STUDENT IN AN ORGANIZED HEALTH CARE EDUCATION/TRAINING PROGRAM

## 2023-12-15 PROCEDURE — 82728 ASSAY OF FERRITIN: CPT | Performed by: STUDENT IN AN ORGANIZED HEALTH CARE EDUCATION/TRAINING PROGRAM

## 2023-12-15 PROCEDURE — 80061 LIPID PANEL: CPT | Performed by: STUDENT IN AN ORGANIZED HEALTH CARE EDUCATION/TRAINING PROGRAM

## 2023-12-15 RX ORDER — FLUOXETINE 10 MG/1
CAPSULE ORAL
Qty: 49 CAPSULE | Refills: 0 | Status: CANCELLED | OUTPATIENT
Start: 2023-12-15 | End: 2024-01-11

## 2023-12-15 RX ORDER — LACTIC ACID, L-, CITRIC ACID MONOHYDRATE, AND POTASSIUM BITARTRATE 90; 50; 20 MG/5G; MG/5G; MG/5G
GEL VAGINAL
COMMUNITY
Start: 2023-01-12

## 2023-12-15 RX ORDER — POLYSACCHARIDE-IRON COMPLEX 150 MG/1
CAPSULE ORAL
COMMUNITY
Start: 2023-08-23 | End: 2024-03-22

## 2023-12-15 ASSESSMENT — ENCOUNTER SYMPTOMS
EYE PAIN: 0
NERVOUS/ANXIOUS: 1
DIZZINESS: 1
DIARRHEA: 0
MYALGIAS: 1
PALPITATIONS: 0
FREQUENCY: 0
BREAST MASS: 0
HEARTBURN: 0
ABDOMINAL PAIN: 1
SHORTNESS OF BREATH: 0
NAUSEA: 1
ARTHRALGIAS: 0
SORE THROAT: 0
HEMATURIA: 0
PARESTHESIAS: 1
DYSURIA: 0
FEVER: 0
HEADACHES: 0
CHILLS: 0
HEMATOCHEZIA: 1
COUGH: 0
WEAKNESS: 0
JOINT SWELLING: 0
CONSTIPATION: 0

## 2023-12-15 ASSESSMENT — PAIN SCALES - GENERAL: PAINLEVEL: NO PAIN (0)

## 2023-12-15 NOTE — COMMUNITY RESOURCES LIST (ENGLISH)
12/15/2023   [facility]  N/A  For questions about this resource list or additional care needs, please contact your primary care clinic or care manager.  Phone: [phone]   Email: N/A   Address: [address] [place] [address]   Hours: N/A        Financial Stability       Rent and mortgage payment assistance  1  [facility] Distance: 0.18 miles      Phone/Virtual   [address] [place] [address]  Language: English  Hours: Mon - Fri 9:00 AM - 4:00 PM  Fees: Free   Phone: (486) 806-9907 Email: johnathon@[facility] Hospital[facility].WOWIO Website: http://PurpleBricks.org/[facility]/ou-qnyq-pyjwzkendalle/     2  [employer] ([facility]) - [place] Office - Supportive Housing Assistance Program (SHAP) - Rent and mortgage payment assistance Distance: 0.27 miles      Phone/Virtual   [address] [place] [address]  Language: English, Hmong, Isis, Gabonese  Hours: Mon - Fri 8:30 AM - 5:00 PM  Fees: Free   Phone: (973) 657-6006 Email: shayy@[employer].WOWIO Website: http://www.Kamego.org/[facility]-impact-areas/     Utility payment assistance  3  [facility] Distance: 0.18 miles      Phone/Virtual   [address] [place] [address]  Language: English  Hours: Mon - Fri 9:00 AM - 4:00 PM  Fees: Free   Phone: (279) 146-7177 Email: johnathon@[facility].WOWIO Website: http://PurpleB[employer].org/[facility]/ap-ghri-codxdpepe/     4  [employer] ([facility]) - [place] Office - Supportive Housing Assistance Program (SHAP) - Utility payment assistance Distance: 0.27 miles      Phone/Virtual   [address] [place] [address]  Language: English, Hmong, Isis, Gabonese  Hours: Mon - Fri 8:30 AM - 5:00 PM  Fees: Free   Phone: (311) 599-9440 Email: shayy@[employer].org Website: http://www.hmong.org/hap-impact-areas/          Food and Nutrition       Food pantry  5  [facility]  [facility] Distance: 0.18 miles      [facility]   [address] [place] [address]  Language: English  Hours: Mon - Tue 9:00 AM - 3:00 PM  Fees: Free, Self Pay   Phone: (984) 564-1845 Email: johnathon@[facility] Website: http://[facility].org/[facility]/     6  [facility] Distance: 0.24 miles      Pickup   [address] [place] [address]  Language: English  Hours: Mon - Fri 12:00 PM - 1:00 PM  Fees: Free   Phone: (378) 548-5161 Email: info@[facility].MyDROBE Website: https://www.[facility].org/locations/[facility]_ymca     SNAP application assistance  7  [facility] Distance: 0.18 miles      Phone/Virtual   [address] [place] [address]  Language: English  Hours: Mon - Thu 9:00 AM - 4:00 PM , Fri 9:00 AM - 2:00 PM , Sun 10:00 AM - 12:00 PM  Fees: Free   Phone: (685) 798-8543 Email: johnathon@[facility] Website: http://[facility].org/[facility]/     8  [employer] [facility] Distance: 0.63 miles      In-Person   [address] [place] [address]  Language: English, Mongolian  Hours: Mon - Fri 8:30 AM - 5:00 PM  Fees: Free   Phone: (715) 869-8625 Email: info@[employer].org Website: http://www.[employer].org     Soup kitchen or free meals  9  [facility] Distance: 0.18 miles      [place] [id number]  Language: English  Hours: Mon - Fri 11:45 AM - 12:45 PM  Fees: Free   Phone: (951) 538-4437 Email: johnathon@[facility].salvationarmy.org Website: http://[facility].org/community/lg-zley-kiitr-sally/     10  [facility] Distance: 1.98 miles      [facility]   [address] [place] [address]  Language: English  Hours: Wed 5:30 PM - 6:30 PM  Fees: Free   Phone: (252) 525-7490 Email:  office@[facility].org Website: https://www.[facility].org          Transportation       Free or low-cost transportation  11  [employer] [place] [employer] Distance: 5.16 miles      In-Person   [address] [place] [address]  Language: English  Hours: Tue 9:00 AM - 2:00 PM  Fees: Self Pay   Phone: (500) 335-2909 Email: info@[employer] Website: http://www.CinemaWell.comorg/     12  [employer] - Free or low-cost transportation Distance: 5.96 miles      In-Person   [address] [place] [address]  Language: English  Hours: Mon - Fri 9:00 AM - 5:00 PM  Fees: Free   Phone: (909) 135-3809 Email: info@xoompark Website: https://www.xoompark/     Transportation to medical appointments  13  [employer] [facility] [employer] - Non-Emergency Medical Transportation Distance: 2.72 miles      In-Person   [address] [place] [address]  Language: English  Hours: Mon - Fri 8:00 AM - 4:00 PM Appt. Only  Fees: Self Pay   Phone: (802) 447-4991 Website: http://www.[facility].org/Medical-Services/Emergency-medical-services/Non-emergency-transportation/     14  [employer] Distance: 3.31 miles      In-Person   [address] [address]  Language: English  Hours: Mon - Thu 6:00 AM - 6:00 PM , Fri 6:00 AM - 5:00 PM  Fees: Insurance, Self Pay   Phone: (792) 473-6627 Email: office@[employer] Website: https://www.[employer]ur[employer]/          Important Numbers & Websites       Emergency Services   911  City Services   311  Poison Control   (268) 313-9459  Suicide Prevention Lifeline   (914) 386-2098 (TALK)  Child Abuse Hotline   (836) 226-5585 (4-A-Child)  Sexual Assault Hotline   (511) 546-1732 (HOPE)  National Runaway Safeline   (580) 308-2325 (RUNAWAY)  All-Options Talkline   (275) 724-4716  Substance Abuse Referral   (798) 650-3358 (HELP)

## 2023-12-15 NOTE — PATIENT INSTRUCTIONS
Reach out to psychiatrist for med refills.  Labs today  Schedule mammogram    Dr. Santiago    Preventive Health Recommendations  Female Ages 40 to 49    Yearly exam:   See your health care provider every year in order to  Review health changes.   Discuss preventive care.    Review your medicines if your doctor prescribed any.    Get a Pap test every three years (unless you have an abnormal result and your provider advises testing more often).    If you get Pap tests with HPV test, you only need to test every 5 years, unless you have an abnormal result. You do not need a Pap test if your uterus was removed (hysterectomy) and you have not had cancer.    You should be tested each year for STDs (sexually transmitted diseases), if you're at risk.   Ask your doctor if you should have a mammogram.    Have a colonoscopy (test for colon cancer) beginning at age 45.  Ask your provider about other options like a yearly FIT test or Cologuard test every 3 years (stool tests)      Have a cholesterol test every 5 years.     Have a diabetes test (fasting glucose) after age 45. If you are at risk for diabetes, you should have this test every 3 years.    Shots: Get a flu shot each year. Get a tetanus shot every 10 years.     Nutrition:   Eat at least 5 servings of fruits and vegetables each day.  Eat whole-grain bread, whole-wheat pasta and brown rice instead of white grains and rice.  Get adequate Calcium and Vitamin D.      Lifestyle  Exercise at least 150 minutes a week (an average of 30 minutes a day, 5 days a week). This will help you control your weight and prevent disease.  Limit alcohol to one drink per day.  No smoking.   Wear sunscreen to prevent skin cancer.  See your dentist every six months for an exam and cleaning.

## 2023-12-15 NOTE — PROGRESS NOTES
SUBJECTIVE:   maribeth is a 43 year old, presenting for the following:  Physical        12/15/2023     2:25 PM   Additional Questions   Roomed by Ksenia WATSON RN   Accompanied by Self         12/15/2023     2:25 PM   Patient Reported Additional Medications   Patient reports taking the following new medications None     Healthy Habits:     Getting at least 3 servings of Calcium per day:  Yes    Bi-annual eye exam:  NO    Dental care twice a year:  NO    Sleep apnea or symptoms of sleep apnea:  None    Diet:  Vegetarian/vegan and Gluten-free/reduced    Frequency of exercise:  4-5 days/week    Duration of exercise:  45-60 minutes    Taking medications regularly:  Yes    Additional concerns today:  No    Work-caterer  Diet-vegetarian diet, gluten free (dairy, soy, nuts)  Exercise-active job, walking, garden, yoga  Fam hx ovarian, breast, colon ca-    LMP-regular, no concerns  Pap/hpv-due   Mammo-DUE    Mood  -med rec    Hx alcohol use disorder  -naltrexone helping    MDD  -seeing psychiatry and therapy  -abilify, prozac    Anemia  -improving    Today's PHQ-9 Score:       2023     2:58 PM   PHQ-9 SCORE   PHQ-9 Total Score MyChart 3 (Minimal depression)   PHQ-9 Total Score 3       Have you ever done Advance Care Planning? (For example, a Health Directive, POLST, or a discussion with a medical provider or your loved ones about your wishes): No, advance care planning information given to patient to review.  Patient declined advance care planning discussion at this time.    Social History     Tobacco Use    Smoking status: Former     Packs/day: 0.50     Years: 16.00     Additional pack years: 0.00     Total pack years: 8.00     Types: Cigarettes     Start date: 1996     Quit date: 2012     Years since quittin.1    Smokeless tobacco: Never   Substance Use Topics    Alcohol use: Not Currently     Comment: in recovery since 2023     9:40 AM   Alcohol Use   Prescreen: >3  drinks/day or >7 drinks/week? Not Applicable     Reviewed orders with patient.  Reviewed health maintenance and updated orders accordingly - Yes      Breast Cancer Screenin/8/2023     9:42 AM   Breast CA Risk Assessment (FHS-7)   Do you have a family history of breast, colon, or ovarian cancer? No / Unknown       Pertinent mammograms are reviewed under the imaging tab.    History of abnormal Pap smear: NO - age 30-65 PAP every 5 years with negative HPV co-testing recommended      3/2/2021     4:26 PM   PAP / HPV   PAP-ABSTRACT See Scanned Document           This result is from an external source.     Reviewed and updated as needed this visit by clinical staff   Tobacco  Allergies  Meds  Problems             Reviewed and updated as needed this visit by Provider     Meds  Problems                Review of Systems   Constitutional:  Negative for chills and fever.   HENT:  Negative for congestion, ear pain, hearing loss and sore throat.    Eyes:  Positive for visual disturbance. Negative for pain.   Respiratory:  Negative for cough and shortness of breath.    Cardiovascular:  Negative for chest pain, palpitations and peripheral edema.   Gastrointestinal:  Positive for abdominal pain, hematochezia and nausea. Negative for constipation, diarrhea and heartburn.   Breasts:  Negative for tenderness, breast mass and discharge.   Genitourinary:  Positive for pelvic pain. Negative for dysuria, frequency, genital sores, hematuria, urgency, vaginal bleeding and vaginal discharge.   Musculoskeletal:  Positive for myalgias. Negative for arthralgias and joint swelling.   Skin:  Negative for rash.   Neurological:  Positive for dizziness and paresthesias. Negative for weakness and headaches.   Psychiatric/Behavioral:  Negative for mood changes. The patient is nervous/anxious.         OBJECTIVE:   /78 (BP Location: Right arm, Patient Position: Sitting, Cuff Size: Adult Regular)   Pulse 82   Temp 97.8  F (36.6  " C) (Temporal)   Resp 16   Ht 1.599 m (5' 2.95\")   Wt 71.1 kg (156 lb 11.2 oz)   LMP 11/20/2023 (Approximate)   SpO2 98%   BMI 27.80 kg/m      Physical Exam  Constitutional:       General: She is not in acute distress.     Appearance: She is well-developed.   HENT:      Head: Normocephalic and atraumatic.      Right Ear: Tympanic membrane, ear canal and external ear normal.      Left Ear: Tympanic membrane, ear canal and external ear normal.      Nose: Nose normal.      Mouth/Throat:      Pharynx: No oropharyngeal exudate.   Eyes:      Extraocular Movements: Extraocular movements intact.      Conjunctiva/sclera: Conjunctivae normal.      Pupils: Pupils are equal, round, and reactive to light.   Cardiovascular:      Rate and Rhythm: Normal rate and regular rhythm.      Heart sounds: Normal heart sounds. No murmur heard.  Pulmonary:      Effort: Pulmonary effort is normal.      Breath sounds: No wheezing or rales.   Abdominal:      General: Bowel sounds are normal.      Palpations: Abdomen is soft.      Tenderness: There is no abdominal tenderness.   Musculoskeletal:      Cervical back: Normal range of motion and neck supple. No rigidity.   Lymphadenopathy:      Cervical: No cervical adenopathy.   Skin:     General: Skin is warm and dry.      Findings: No rash.   Neurological:      General: No focal deficit present.      Mental Status: She is alert and oriented to person, place, and time.   Psychiatric:         Mood and Affect: Mood normal.         Behavior: Behavior normal.           ASSESSMENT/PLAN:   Boom was seen today for physical.    Routine general medical examination at a health care facility  -Vitals: WNL  -Mammo: DUE, ordered  -Pap: due 2026  -Immunizations: UTD  -Labs: iron studies, liver enzymes, lipid  -Colon Cancer screening: due next year    Severe episode of recurrent major depressive disorder, without psychotic features (H)  Mood feels good. Working with psychiatrist and therapist. Feels " "medications are working well. Advised to follow up with psychiatrist for med refills.    Mild alcohol abuse in early remission  Encounter for therapeutic drug monitoring  Stable doing well on naltrexone. Will check LFT's today.  -     Comprehensive metabolic panel; Future    Iron deficiency anemia due to chronic blood loss  Improving with supplement. Will recheck today.  -     Ferritin; Future  -     Hemoglobin; Future    Hypercholesterolemia  -     Lipid panel; Future    Encounter for screening mammogram for malignant neoplasm of breast  -     *MA Screening Digital Bilateral; Future    Other orders  -     PRIMARY CARE FOLLOW-UP SCHEDULING; Future      COUNSELING:  Reviewed preventive health counseling, as reflected in patient instructions      BMI:   Estimated body mass index is 27.8 kg/m  as calculated from the following:    Height as of this encounter: 1.599 m (5' 2.95\").    Weight as of this encounter: 71.1 kg (156 lb 11.2 oz).   Weight management plan: Discussed healthy diet and exercise guidelines      She reports that she quit smoking about 11 years ago. Her smoking use included cigarettes. She started smoking about 27 years ago. She has a 8.00 pack-year smoking history. She has never used smokeless tobacco.        Hernando Santiago, DO  [facility]  Answers submitted by the patient for this visit:  Patient Health Questionnaire (Submitted on 12/14/2023)  If you checked off any problems, how difficult have these problems made it for you to do your work, take care of things at home, or get along with other people?: Somewhat difficult  PHQ9 TOTAL SCORE: 3    "

## 2024-03-22 ENCOUNTER — OFFICE VISIT (OUTPATIENT)
Dept: FAMILY MEDICINE | Facility: CLINIC | Age: 44
End: 2024-03-22
Payer: COMMERCIAL

## 2024-03-22 VITALS
BODY MASS INDEX: 29.54 KG/M2 | OXYGEN SATURATION: 98 % | DIASTOLIC BLOOD PRESSURE: 78 MMHG | RESPIRATION RATE: 20 BRPM | SYSTOLIC BLOOD PRESSURE: 122 MMHG | TEMPERATURE: 97.9 F | HEIGHT: 63 IN | WEIGHT: 166.7 LBS | HEART RATE: 78 BPM

## 2024-03-22 DIAGNOSIS — F10.11 MILD ALCOHOL ABUSE IN EARLY REMISSION: ICD-10-CM

## 2024-03-22 DIAGNOSIS — N93.9 ABNORMAL UTERINE BLEEDING (AUB): Primary | ICD-10-CM

## 2024-03-22 DIAGNOSIS — F33.2 SEVERE EPISODE OF RECURRENT MAJOR DEPRESSIVE DISORDER, WITHOUT PSYCHOTIC FEATURES (H): ICD-10-CM

## 2024-03-22 LAB
ERYTHROCYTE [DISTWIDTH] IN BLOOD BY AUTOMATED COUNT: 13.9 % (ref 10–15)
HCT VFR BLD AUTO: 41.5 % (ref 35–47)
HGB BLD-MCNC: 13.5 G/DL (ref 11.7–15.7)
MCH RBC QN AUTO: 26.4 PG (ref 26.5–33)
MCHC RBC AUTO-ENTMCNC: 32.5 G/DL (ref 31.5–36.5)
MCV RBC AUTO: 81 FL (ref 78–100)
PLATELET # BLD AUTO: 298 10E3/UL (ref 150–450)
RBC # BLD AUTO: 5.11 10E6/UL (ref 3.8–5.2)
WBC # BLD AUTO: 8.7 10E3/UL (ref 4–11)

## 2024-03-22 PROCEDURE — 99214 OFFICE O/P EST MOD 30 MIN: CPT | Performed by: PHYSICIAN ASSISTANT

## 2024-03-22 PROCEDURE — 36415 COLL VENOUS BLD VENIPUNCTURE: CPT | Performed by: PHYSICIAN ASSISTANT

## 2024-03-22 PROCEDURE — 85027 COMPLETE CBC AUTOMATED: CPT | Performed by: PHYSICIAN ASSISTANT

## 2024-03-22 PROCEDURE — 84702 CHORIONIC GONADOTROPIN TEST: CPT | Performed by: PHYSICIAN ASSISTANT

## 2024-03-22 RX ORDER — FLUOXETINE 10 MG/1
10 CAPSULE ORAL DAILY
Qty: 90 CAPSULE | Refills: 1 | Status: SHIPPED | OUTPATIENT
Start: 2024-03-22 | End: 2024-09-23

## 2024-03-22 RX ORDER — FLUOXETINE 10 MG/1
10 CAPSULE ORAL DAILY
COMMUNITY
End: 2024-03-22

## 2024-03-22 ASSESSMENT — PAIN SCALES - GENERAL: PAINLEVEL: NO PAIN (0)

## 2024-03-22 ASSESSMENT — ENCOUNTER SYMPTOMS: CRAMPS: 1

## 2024-03-22 NOTE — PROGRESS NOTES
"  Assessment & Plan     Abnormal uterine bleeding (AUB) - heavy bleeding with tissue passage, will check HCG to see if possibly was related to miscarriage. If negative, would recommend watchful waiting and if persistent would recommend pelvic US. If HCG positive, recommend repeat in 48 hours to verify falling.  - HCG quantitative pregnancy  - CBC with platelets  - HCG quantitative pregnancy  - HCG quantitative pregnancy  - CBC with platelets    Severe episode of recurrent major depressive disorder, without psychotic features (H) - stable, refills provided. Continues with therapy.  - FLUoxetine (PROZAC) 10 MG capsule  Dispense: 90 capsule; Refill: 1  - FLUoxetine (PROZAC) 20 MG capsule  Dispense: 90 capsule; Refill: 1    Mild EtOH abuse in early remission - 7 months sober as of 3/13/24. She is wondering about ultimately getting of of naltrexone, will continue for now and can consider in the future when things have been stable - currently still working through divorce.    The longitudinal plan of care for the diagnosis(es)/condition(s) as documented were addressed during this visit. Due to the added complexity in care, I will continue to support boom in the subsequent management and with ongoing continuity of care.      Subjective   boom is a 43 year old, presenting for the following health issues:  Vaginal Bleeding and Abdominal Cramping        3/22/2024    12:49 PM   Additional Questions   Roomed by Zoya   Accompanied by alone         3/22/2024    12:49 PM   Patient Reported Additional Medications   Patient reports taking the following new medications none     Boom here today to discuss AUB    Really light periods for the past year - last few were really light to the point where she didn't even have to use a panty liner  Bleeding started 3/4 and was very heavy 3/6-7 - shedding large \"globs\" of tissue  Bleeding ended 3/12  Did have some breast tenderness  Had some nausea while bleeding but nothing that she " "would consider morning sickness    7 month sobriety on 3/13    History of Present Illness       Reason for visit:  Weird menstrual event. could have been miscarriage? looking for answers.  Symptom onset:  1-2 weeks ago  Symptoms include:  Bleeding over 11 days with lots of tissue and heavy cramping  Symptom intensity:  Moderate  Symptom progression:  Improving  Had these symptoms before:  Yes  Has tried/received treatment for these symptoms:  No  What makes it better:  Ibuprofen    She eats 2-3 servings of fruits and vegetables daily.She consumes 2 sweetened beverage(s) daily.She exercises with enough effort to increase her heart rate 30 to 60 minutes per day.  She exercises with enough effort to increase her heart rate 7 days per week.   She is taking medications regularly.           Objective    /78 (BP Location: Right arm, Patient Position: Sitting, Cuff Size: Adult Regular)   Pulse 78   Temp 97.9  F (36.6  C) (Temporal)   Resp 20   Ht 1.595 m (5' 2.8\")   Wt 75.6 kg (166 lb 11.2 oz)   LMP  (LMP Unknown)   SpO2 98%   BMI 29.72 kg/m    Body mass index is 29.72 kg/m .  Physical Exam   GENERAL: alert and no distress  PSYCH: mentation appears normal, affect normal/bright          Signed Electronically by: Geraldine Good PA-C    "

## 2024-03-23 LAB — HCG INTACT+B SERPL-ACNC: 9 MIU/ML

## 2024-03-24 ENCOUNTER — LAB (OUTPATIENT)
Dept: LAB | Facility: CLINIC | Age: 44
End: 2024-03-24
Payer: COMMERCIAL

## 2024-03-24 ENCOUNTER — HEALTH MAINTENANCE LETTER (OUTPATIENT)
Age: 44
End: 2024-03-24

## 2024-03-24 DIAGNOSIS — N93.9 ABNORMAL UTERINE BLEEDING (AUB): ICD-10-CM

## 2024-03-24 PROCEDURE — 84702 CHORIONIC GONADOTROPIN TEST: CPT

## 2024-03-24 PROCEDURE — 36415 COLL VENOUS BLD VENIPUNCTURE: CPT

## 2024-03-25 LAB — HCG INTACT+B SERPL-ACNC: 4 MIU/ML

## 2024-08-09 ENCOUNTER — MYC MEDICAL ADVICE (OUTPATIENT)
Dept: FAMILY MEDICINE | Facility: CLINIC | Age: 44
End: 2024-08-09
Payer: COMMERCIAL

## 2024-08-09 DIAGNOSIS — Z32.01 PREGNANCY TEST POSITIVE: Primary | ICD-10-CM

## 2024-08-13 NOTE — TELEPHONE ENCOUNTER
"Geraldine,    Pt calling back and she made an appt with an OB GYN provider on Aug 28, the soonest we could get her on your schedule is 9/18     Home preg was positive. She did miss her menses and thinks she would be 5 1/2 weeks    Hx of miscarriage - she has had about 3 miscarriages, last one was in March    No viable pregnancies that she has ever had that resulted in a live birth. She does intend to go through with pregnancy    She is very worried \"Could I really have a viable pregnancy\"    Should she just keep her appt with OBGYN or do you want to do this as sooner virtual or e visit for serial hcg testing.?    Karo Yip RN, BSN  [facility]         "

## 2024-08-13 NOTE — TELEPHONE ENCOUNTER
Writer called and left message on patient's voicemail to call back and speak with a triage nurse.    Writer spoke with Geraldine Good. Geraldine gave approval for next available same day appointment related to pregnancy.     Writer responded via [employer].    Ely Anderson, BSN RN  [facility]

## 2024-08-14 ENCOUNTER — LAB (OUTPATIENT)
Dept: LAB | Facility: CLINIC | Age: 44
End: 2024-08-14
Payer: COMMERCIAL

## 2024-08-14 DIAGNOSIS — Z32.01 PREGNANCY TEST POSITIVE: ICD-10-CM

## 2024-08-14 PROCEDURE — 36415 COLL VENOUS BLD VENIPUNCTURE: CPT

## 2024-08-14 PROCEDURE — 84702 CHORIONIC GONADOTROPIN TEST: CPT

## 2024-08-15 LAB — HCG INTACT+B SERPL-ACNC: ABNORMAL MIU/ML

## 2024-08-16 ENCOUNTER — LAB (OUTPATIENT)
Dept: LAB | Facility: CLINIC | Age: 44
End: 2024-08-16
Payer: COMMERCIAL

## 2024-08-16 DIAGNOSIS — Z32.01 PREGNANCY TEST POSITIVE: ICD-10-CM

## 2024-08-16 LAB — HCG INTACT+B SERPL-ACNC: ABNORMAL MIU/ML

## 2024-08-16 PROCEDURE — 84702 CHORIONIC GONADOTROPIN TEST: CPT

## 2024-08-16 PROCEDURE — 36415 COLL VENOUS BLD VENIPUNCTURE: CPT

## 2024-08-20 ENCOUNTER — TELEPHONE (OUTPATIENT)
Dept: MIDWIFE SERVICES | Facility: CLINIC | Age: 44
End: 2024-08-20
Payer: COMMERCIAL

## 2024-08-20 ENCOUNTER — MYC MEDICAL ADVICE (OUTPATIENT)
Dept: FAMILY MEDICINE | Facility: CLINIC | Age: 44
End: 2024-08-20
Payer: COMMERCIAL

## 2024-08-20 NOTE — TELEPHONE ENCOUNTER
Health Call Center    Phone Message    May a detailed message be left on voicemail: yes     Reason for Call: Patient is calling to speak to the triage nurse, she said she had a bad reading for HCG results and was told by her doctor to call and speak to a nurse about next steps or if the appt should be moved up    Action Taken: Other: obgyn    Travel Screening: Not Applicable     Date of Service:

## 2024-08-20 NOTE — TELEPHONE ENCOUNTER
Mahi Lozano, Gladys Mejia, CMA  Caller: Unspecified (Today,  1:30 PM)  Since pt has not yet established care with the midwivs, pt needs to make an appt to come in to discuss results and make a plan. Please call her and offer her an appt. 20 appt.    It looks like Lupe has an appt in the morning.    She must establish care with us before we can manage her care.    SH

## 2024-08-20 NOTE — TELEPHONE ENCOUNTER
Return call to patient.  Advised that per CNM, we will need to have patient establish care with the CNM to review results and create a follow-up plan.  Patient accepts.  Appointment scheduled with Isis Nicolas CNM for 8/22/24 at 2:00PM.  Patient offers no further questions or concerns at this time.

## 2024-08-21 LAB
ABO/RH(D): NORMAL
ANTIBODY SCREEN: NEGATIVE
SPECIMEN EXPIRATION DATE: NORMAL

## 2024-08-22 ENCOUNTER — OFFICE VISIT (OUTPATIENT)
Dept: MIDWIFE SERVICES | Facility: CLINIC | Age: 44
End: 2024-08-22
Payer: COMMERCIAL

## 2024-08-22 ENCOUNTER — HOSPITAL ENCOUNTER (OUTPATIENT)
Dept: ULTRASOUND IMAGING | Facility: HOSPITAL | Age: 44
Discharge: HOME | End: 2024-08-22
Attending: ADVANCED PRACTICE MIDWIFE | Admitting: ADVANCED PRACTICE MIDWIFE
Payer: COMMERCIAL

## 2024-08-22 VITALS
DIASTOLIC BLOOD PRESSURE: 70 MMHG | HEART RATE: 66 BPM | BODY MASS INDEX: 29.03 KG/M2 | SYSTOLIC BLOOD PRESSURE: 124 MMHG | WEIGHT: 162.8 LBS

## 2024-08-22 DIAGNOSIS — O36.80X0 PREGNANCY OF UNKNOWN ANATOMIC LOCATION: ICD-10-CM

## 2024-08-22 DIAGNOSIS — O02.81 INAPPROPRIATE CHANGE IN QUANTITATIVE HUMAN CHORIONIC GONADOTROPIN (HCG) IN EARLY PREGNANCY: ICD-10-CM

## 2024-08-22 DIAGNOSIS — O02.1 MISSED AB: Primary | ICD-10-CM

## 2024-08-22 DIAGNOSIS — O20.0 MISCARRIAGE, THREATENED, EARLY PREGNANCY: ICD-10-CM

## 2024-08-22 LAB — HCG INTACT+B SERPL-ACNC: ABNORMAL MIU/ML

## 2024-08-22 PROCEDURE — 84702 CHORIONIC GONADOTROPIN TEST: CPT | Performed by: ADVANCED PRACTICE MIDWIFE

## 2024-08-22 PROCEDURE — 76801 OB US < 14 WKS SINGLE FETUS: CPT

## 2024-08-22 PROCEDURE — 36415 COLL VENOUS BLD VENIPUNCTURE: CPT | Performed by: ADVANCED PRACTICE MIDWIFE

## 2024-08-22 PROCEDURE — 86850 RBC ANTIBODY SCREEN: CPT | Performed by: ADVANCED PRACTICE MIDWIFE

## 2024-08-22 PROCEDURE — 86901 BLOOD TYPING SEROLOGIC RH(D): CPT | Performed by: ADVANCED PRACTICE MIDWIFE

## 2024-08-22 PROCEDURE — 99203 OFFICE O/P NEW LOW 30 MIN: CPT | Performed by: ADVANCED PRACTICE MIDWIFE

## 2024-08-22 PROCEDURE — 86900 BLOOD TYPING SEROLOGIC ABO: CPT | Performed by: ADVANCED PRACTICE MIDWIFE

## 2024-08-22 RX ORDER — ARIPIPRAZOLE 5 MG/1
TABLET ORAL
COMMUNITY
Start: 2024-01-08

## 2024-08-22 RX ORDER — NALTREXONE HYDROCHLORIDE 50 MG/1
50 TABLET, FILM COATED ORAL DAILY
COMMUNITY
Start: 2024-08-22

## 2024-08-22 NOTE — PROGRESS NOTES
"Office Visit:     Assessment:   43 year old  at 8w2d based on LMP  Inappropriately rising hCG level  Blood type unknown    Plan:   -Discussed lab results showing an inappropriate rise in hCG levels.  Discussed differential diagnoses which could include threatened miscarriage, missed AB, or ectopic pregnancy.  -Lab: Stat HCG quant and ABO type and screen.   -OB transvaginal ultrasound to evaluate pregnancy dating, viability, location.  Ordered as a hold and call to the on-call CNM.  Scheduled at 240 today at the [facility].  Will plan to refer to [facility] with any suspicious findings for ectopic pregnancy.  -Medical, surgical, family and obstetrical history reviewed.   -Anticipatory guidance given verbally and written re: signs or symptoms of miscarriage and ectopic pregnancy. Warning signs and when to seek urgent care reviewed and on-call CNM phone number shared.  -Encouraged PNV with folic acid, Rx sent to pharmacy.  Additional preconception information shared via AVS as patient is hoping for a pregnancy in the future.    35  minutes spent on the date of the encounter doing chart review, review of test results, patient visit and documentation     Subjective:   Priscilla is a 43 year old, , here to follow-up on pregnancy test.  Orders entered by primary care provider to evaluate pregnancy hormone levels.  After her second level returned, was encouraged to call the OB clinic to discuss further.  Patient states she knows the rise was abnormal and it likely indicates an abnormal pregnancy. \"I have done my grieving\".  She denies any abdominal pain or cramping.  She denies any vaginal bleeding or spotting.  She denies any vaginal itching or irritation.    She is going through a divorce and has recently connected with a new partner.  Feels safe in this relationship.  No known STI exposure.  She is open to a pregnancy with this new partner.    Patient's last menstrual period was 2024 " (approximate). She is having regular periods q28 days, bleeding 4 days, light. Menarche at age 12.    Current symptoms also include: breast tenderness, fatigue, and positive home pregnancy test.       Her chronic health conditions including mental health is managed by her primary care provider at the [facility].  She has been on naltrexone but discontinued with positive pregnancy test.    Review of Systems  Denies bleeding, pain or cramping, abnormal vaginal discharge or dysuria.    Past Medical History:   Diagnosis Date    Anxiety     Depressive disorder 10/01/1995    Hypertension 10/01/2014    cutting out alcohol seems to have stabilized my BP       Past Surgical History:   Procedure Laterality Date    BIOPSY  ?    LEEP procedure    COLONOSCOPY         OB History    Para Term  AB Living   2 0 0 0 1 0   SAB IAB Ectopic Multiple Live Births   1 0 0 0 0      # Outcome Date GA Lbr Yefri/2nd Weight Sex Type Anes PTL Lv   2 Current            1 SAB 2024               Family History   Problem Relation Age of Onset    Diabetes Mother     Coronary Artery Disease Mother     Hypertension Mother     Hyperlipidemia Mother     Obesity Mother     Depression Father         commited suicide     Anxiety Disorder Father     Mental Illness Father     Substance Abuse Father     Obesity Father     Other Cancer Sister         brain tumor of unknown type dx , passed away 2013    Obesity Sister         Objective:    /70 (BP Location: Right arm, Patient Position: Sitting, Cuff Size: Adult Regular)   Pulse 66   Wt 73.8 kg (162 lb 12.8 oz)   LMP 2024 (Approximate)   BMI 29.03 kg/m     General: alert and no acute distress      Physical exam including pelvic exam declined by patient.    Lab Review  Urine HCG: positive    Mahi Lozano CNM, PADMINI RHODES  2024   1:36 PM

## 2024-08-29 ENCOUNTER — TRANSFERRED RECORDS (OUTPATIENT)
Dept: HEALTH INFORMATION MANAGEMENT | Facility: CLINIC | Age: 44
End: 2024-08-29
Payer: COMMERCIAL

## 2024-09-05 ENCOUNTER — TRANSFERRED RECORDS (OUTPATIENT)
Dept: HEALTH INFORMATION MANAGEMENT | Facility: CLINIC | Age: 44
End: 2024-09-05
Payer: COMMERCIAL

## 2024-09-10 DIAGNOSIS — F33.2 SEVERE EPISODE OF RECURRENT MAJOR DEPRESSIVE DISORDER, WITHOUT PSYCHOTIC FEATURES (H): ICD-10-CM

## 2024-09-21 DIAGNOSIS — F33.2 SEVERE EPISODE OF RECURRENT MAJOR DEPRESSIVE DISORDER, WITHOUT PSYCHOTIC FEATURES (H): ICD-10-CM

## 2024-09-23 RX ORDER — FLUOXETINE 10 MG/1
CAPSULE ORAL
Qty: 90 CAPSULE | Refills: 1 | Status: SHIPPED | OUTPATIENT
Start: 2024-09-23

## 2024-10-17 DIAGNOSIS — F33.2 SEVERE EPISODE OF RECURRENT MAJOR DEPRESSIVE DISORDER, WITHOUT PSYCHOTIC FEATURES (H): ICD-10-CM

## 2024-10-17 DIAGNOSIS — F10.11 MILD ALCOHOL ABUSE IN EARLY REMISSION: ICD-10-CM

## 2024-10-18 RX ORDER — ARIPIPRAZOLE 2 MG/1
5 TABLET ORAL DAILY
Qty: 90 TABLET | Refills: 3 | Status: SHIPPED | OUTPATIENT
Start: 2024-10-18

## 2025-01-26 ENCOUNTER — HEALTH MAINTENANCE LETTER (OUTPATIENT)
Age: 45
End: 2025-01-26

## 2025-03-16 DIAGNOSIS — F33.2 SEVERE EPISODE OF RECURRENT MAJOR DEPRESSIVE DISORDER, WITHOUT PSYCHOTIC FEATURES (H): ICD-10-CM

## 2025-03-17 RX ORDER — FLUOXETINE 10 MG/1
CAPSULE ORAL
Qty: 90 CAPSULE | Refills: 0 | Status: SHIPPED | OUTPATIENT
Start: 2025-03-17

## 2025-03-17 NOTE — TELEPHONE ENCOUNTER
Due for yearly follow up - physical & mental health follow up  Please schedule appt  Geraldine Good PA-C

## 2025-04-16 SDOH — HEALTH STABILITY: PHYSICAL HEALTH: ON AVERAGE, HOW MANY MINUTES DO YOU ENGAGE IN EXERCISE AT THIS LEVEL?: 60 MIN

## 2025-04-16 SDOH — HEALTH STABILITY: PHYSICAL HEALTH: ON AVERAGE, HOW MANY DAYS PER WEEK DO YOU ENGAGE IN MODERATE TO STRENUOUS EXERCISE (LIKE A BRISK WALK)?: 7 DAYS

## 2025-04-16 ASSESSMENT — SOCIAL DETERMINANTS OF HEALTH (SDOH): HOW OFTEN DO YOU GET TOGETHER WITH FRIENDS OR RELATIVES?: TWICE A WEEK

## 2025-04-21 ENCOUNTER — OFFICE VISIT (OUTPATIENT)
Dept: FAMILY MEDICINE | Facility: CLINIC | Age: 45
End: 2025-04-21
Payer: COMMERCIAL

## 2025-04-21 VITALS
RESPIRATION RATE: 20 BRPM | HEART RATE: 74 BPM | OXYGEN SATURATION: 97 % | DIASTOLIC BLOOD PRESSURE: 64 MMHG | WEIGHT: 165.6 LBS | HEIGHT: 63 IN | BODY MASS INDEX: 29.34 KG/M2 | SYSTOLIC BLOOD PRESSURE: 120 MMHG | TEMPERATURE: 97.4 F

## 2025-04-21 DIAGNOSIS — B96.89 BV (BACTERIAL VAGINOSIS): ICD-10-CM

## 2025-04-21 DIAGNOSIS — E78.00 HYPERCHOLESTEROLEMIA: ICD-10-CM

## 2025-04-21 DIAGNOSIS — Z23 ENCOUNTER FOR IMMUNIZATION: ICD-10-CM

## 2025-04-21 DIAGNOSIS — Z00.00 ROUTINE GENERAL MEDICAL EXAMINATION AT A HEALTH CARE FACILITY: Primary | ICD-10-CM

## 2025-04-21 DIAGNOSIS — F10.11 MILD ALCOHOL ABUSE IN EARLY REMISSION: ICD-10-CM

## 2025-04-21 DIAGNOSIS — Z20.2 EXPOSURE TO SEXUALLY TRANSMITTED INFECTION: ICD-10-CM

## 2025-04-21 DIAGNOSIS — F41.9 ANXIETY: ICD-10-CM

## 2025-04-21 DIAGNOSIS — Z12.31 ENCOUNTER FOR SCREENING MAMMOGRAM FOR BREAST CANCER: ICD-10-CM

## 2025-04-21 DIAGNOSIS — N76.0 BV (BACTERIAL VAGINOSIS): ICD-10-CM

## 2025-04-21 DIAGNOSIS — F33.42 RECURRENT MAJOR DEPRESSIVE DISORDER, IN FULL REMISSION: ICD-10-CM

## 2025-04-21 LAB
CLUE CELLS: PRESENT
ERYTHROCYTE [DISTWIDTH] IN BLOOD BY AUTOMATED COUNT: 13.4 % (ref 10–15)
HCT VFR BLD AUTO: 37.8 % (ref 35–47)
HGB BLD-MCNC: 12 G/DL (ref 11.7–15.7)
MCH RBC QN AUTO: 23.8 PG (ref 26.5–33)
MCHC RBC AUTO-ENTMCNC: 31.7 G/DL (ref 31.5–36.5)
MCV RBC AUTO: 75 FL (ref 78–100)
PLATELET # BLD AUTO: 404 10E3/UL (ref 150–450)
RBC # BLD AUTO: 5.05 10E6/UL (ref 3.8–5.2)
TRICHOMONAS, WET PREP: ABNORMAL
WBC # BLD AUTO: 7.4 10E3/UL (ref 4–11)
WBC'S/HIGH POWER FIELD, WET PREP: ABNORMAL
YEAST, WET PREP: ABNORMAL

## 2025-04-21 PROCEDURE — 87389 HIV-1 AG W/HIV-1&-2 AB AG IA: CPT | Performed by: PHYSICIAN ASSISTANT

## 2025-04-21 PROCEDURE — 80048 BASIC METABOLIC PNL TOTAL CA: CPT | Performed by: PHYSICIAN ASSISTANT

## 2025-04-21 PROCEDURE — 3078F DIAST BP <80 MM HG: CPT | Performed by: PHYSICIAN ASSISTANT

## 2025-04-21 PROCEDURE — 86780 TREPONEMA PALLIDUM: CPT | Performed by: PHYSICIAN ASSISTANT

## 2025-04-21 PROCEDURE — 87210 SMEAR WET MOUNT SALINE/INK: CPT | Performed by: PHYSICIAN ASSISTANT

## 2025-04-21 PROCEDURE — 80061 LIPID PANEL: CPT | Performed by: PHYSICIAN ASSISTANT

## 2025-04-21 PROCEDURE — 99214 OFFICE O/P EST MOD 30 MIN: CPT | Mod: 25 | Performed by: PHYSICIAN ASSISTANT

## 2025-04-21 PROCEDURE — 99396 PREV VISIT EST AGE 40-64: CPT | Mod: 25 | Performed by: PHYSICIAN ASSISTANT

## 2025-04-21 PROCEDURE — 85027 COMPLETE CBC AUTOMATED: CPT | Performed by: PHYSICIAN ASSISTANT

## 2025-04-21 PROCEDURE — 87491 CHLMYD TRACH DNA AMP PROBE: CPT | Performed by: PHYSICIAN ASSISTANT

## 2025-04-21 PROCEDURE — 1126F AMNT PAIN NOTED NONE PRSNT: CPT | Performed by: PHYSICIAN ASSISTANT

## 2025-04-21 PROCEDURE — 36415 COLL VENOUS BLD VENIPUNCTURE: CPT | Performed by: PHYSICIAN ASSISTANT

## 2025-04-21 PROCEDURE — 90471 IMMUNIZATION ADMIN: CPT | Performed by: PHYSICIAN ASSISTANT

## 2025-04-21 PROCEDURE — 87591 N.GONORRHOEAE DNA AMP PROB: CPT | Performed by: PHYSICIAN ASSISTANT

## 2025-04-21 PROCEDURE — 90715 TDAP VACCINE 7 YRS/> IM: CPT | Performed by: PHYSICIAN ASSISTANT

## 2025-04-21 PROCEDURE — 3074F SYST BP LT 130 MM HG: CPT | Performed by: PHYSICIAN ASSISTANT

## 2025-04-21 PROCEDURE — 96127 BRIEF EMOTIONAL/BEHAV ASSMT: CPT | Performed by: PHYSICIAN ASSISTANT

## 2025-04-21 RX ORDER — ARIPIPRAZOLE 2 MG/1
2 TABLET ORAL DAILY
Qty: 90 TABLET | Refills: 3 | Status: SHIPPED | OUTPATIENT
Start: 2025-04-21

## 2025-04-21 RX ORDER — FLUOXETINE 10 MG/1
10 CAPSULE ORAL DAILY
Qty: 90 CAPSULE | Refills: 3 | Status: SHIPPED | OUTPATIENT
Start: 2025-04-21

## 2025-04-21 ASSESSMENT — ANXIETY QUESTIONNAIRES
5. BEING SO RESTLESS THAT IT IS HARD TO SIT STILL: NOT AT ALL
7. FEELING AFRAID AS IF SOMETHING AWFUL MIGHT HAPPEN: SEVERAL DAYS
2. NOT BEING ABLE TO STOP OR CONTROL WORRYING: SEVERAL DAYS
7. FEELING AFRAID AS IF SOMETHING AWFUL MIGHT HAPPEN: SEVERAL DAYS
1. FEELING NERVOUS, ANXIOUS, OR ON EDGE: SEVERAL DAYS
IF YOU CHECKED OFF ANY PROBLEMS ON THIS QUESTIONNAIRE, HOW DIFFICULT HAVE THESE PROBLEMS MADE IT FOR YOU TO DO YOUR WORK, TAKE CARE OF THINGS AT HOME, OR GET ALONG WITH OTHER PEOPLE: SOMEWHAT DIFFICULT
GAD7 TOTAL SCORE: 4
GAD7 TOTAL SCORE: 4
3. WORRYING TOO MUCH ABOUT DIFFERENT THINGS: SEVERAL DAYS
4. TROUBLE RELAXING: NOT AT ALL
GAD7 TOTAL SCORE: 4
6. BECOMING EASILY ANNOYED OR IRRITABLE: NOT AT ALL
8. IF YOU CHECKED OFF ANY PROBLEMS, HOW DIFFICULT HAVE THESE MADE IT FOR YOU TO DO YOUR WORK, TAKE CARE OF THINGS AT HOME, OR GET ALONG WITH OTHER PEOPLE?: SOMEWHAT DIFFICULT

## 2025-04-21 ASSESSMENT — PAIN SCALES - GENERAL: PAINLEVEL_OUTOF10: NO PAIN (0)

## 2025-04-21 ASSESSMENT — PATIENT HEALTH QUESTIONNAIRE - PHQ9
SUM OF ALL RESPONSES TO PHQ QUESTIONS 1-9: 1
SUM OF ALL RESPONSES TO PHQ QUESTIONS 1-9: 1
10. IF YOU CHECKED OFF ANY PROBLEMS, HOW DIFFICULT HAVE THESE PROBLEMS MADE IT FOR YOU TO DO YOUR WORK, TAKE CARE OF THINGS AT HOME, OR GET ALONG WITH OTHER PEOPLE: NOT DIFFICULT AT ALL

## 2025-04-21 NOTE — PATIENT INSTRUCTIONS
Patient Education   Preventive Care Advice   This is general advice given by our system to help you stay healthy. However, your care team may have specific advice just for you. Please talk to your care team about your preventive care needs.  Nutrition  Eat 5 or more servings of fruits and vegetables each day.  Try wheat bread, brown rice and whole grain pasta (instead of white bread, rice, and pasta).  Get enough calcium and vitamin D. Check the label on foods and aim for 100% of the RDA (recommended daily allowance).  Lifestyle  Exercise at least 150 minutes each week  (30 minutes a day, 5 days a week).  Do muscle strengthening activities 2 days a week. These help control your weight and prevent disease.  No smoking.  Wear sunscreen to prevent skin cancer.  Have a dental exam and cleaning every 6 months.  Yearly exams  See your health care team every year to talk about:  Any changes in your health.  Any medicines your care team has prescribed.  Preventive care, family planning, and ways to prevent chronic diseases.  Shots (vaccines)   HPV shots (up to age 26), if you've never had them before.  Hepatitis B shots (up to age 59), if you've never had them before.  COVID-19 shot: Get this shot when it's due.  Flu shot: Get a flu shot every year.  Tetanus shot: Get a tetanus shot every 10 years.  Pneumococcal, hepatitis A, and RSV shots: Ask your care team if you need these based on your risk.  Shingles shot (for age 50 and up)  General health tests  Diabetes screening:  Starting at age 35, Get screened for diabetes at least every 3 years.  If you are younger than age 35, ask your care team if you should be screened for diabetes.  Cholesterol test: At age 39, start having a cholesterol test every 5 years, or more often if advised.  Bone density scan (DEXA): At age 50, ask your care team if you should have this scan for osteoporosis (brittle bones).  Hepatitis C: Get tested at least once in your life.  STIs (sexually  transmitted infections)  Before age 24: Ask your care team if you should be screened for STIs.  After age 24: Get screened for STIs if you're at risk. You are at risk for STIs (including HIV) if:  You are sexually active with more than one person.  You don't use condoms every time.  You or a partner was diagnosed with a sexually transmitted infection.  If you are at risk for HIV, ask about PrEP medicine to prevent HIV.  Get tested for HIV at least once in your life, whether you are at risk for HIV or not.  Cancer screening tests  Cervical cancer screening: If you have a cervix, begin getting regular cervical cancer screening tests starting at age 21.  Breast cancer scan (mammogram): If you've ever had breasts, begin having regular mammograms starting at age 40. This is a scan to check for breast cancer.  Colon cancer screening: It is important to start screening for colon cancer at age 45.  Have a colonoscopy test every 10 years (or more often if you're at risk) Or, ask your provider about stool tests like a FIT test every year or Cologuard test every 3 years.  To learn more about your testing options, visit:   .  For help making a decision, visit:   https://bit.ly/gp29216.  Prostate cancer screening test: If you have a prostate, ask your care team if a prostate cancer screening test (PSA) at age 55 is right for you.  Lung cancer screening: If you are a current or former smoker ages 50 to 80, ask your care team if ongoing lung cancer screenings are right for you.  For informational purposes only. Not to replace the advice of your health care provider. Copyright   2023 [facility]. All rights reserved. Clinically reviewed by the [facility] Transitions Program. [employer] 569455 - REV 01/24.  Safer Sex: Care Instructions  Overview  Safer sex is a way to reduce your risk of getting a sexually transmitted infection (STI). It can also help prevent pregnancy.  Several products can help you practice  safer sex and reduce your chance of STIs. One of the best is a condom. There are internal and external condoms. You can use a special rubber sheet (dental dam) for protection during oral sex. Disposable gloves can keep your hands from touching blood, semen, or other body fluids that can carry infections.  Remember that birth control methods such as diaphragms, IUDs, foams, and birth control pills do not stop you from getting STIs.  Follow-up care is a key part of your treatment and safety. Be sure to make and go to all appointments, and call your doctor if you are having problems. It's also a good idea to know your test results and keep a list of the medicines you take.  How can you care for yourself at home?  Think about getting vaccinated to help prevent hepatitis A, hepatitis B, and human papillomavirus (HPV). They can be spread through sex.  Use a condom every time you have sex. Use an external condom, which goes on the penis. Or use an internal condom, which goes into the vagina or anus.  Make sure you use the right size external condom. A condom that's too small can break easily. A condom that's too big can slip off during sex.  Use a new condom each time you have sex. Be careful not to poke a hole in the condom when you open the wrapper.  Don't use an internal condom and an external condom at the same time.  Never use petroleum jelly (such as Vaseline), grease, hand lotion, baby oil, or anything with oil in it. These products can make holes in the condom.  After intercourse, hold the edge of the condom as you remove it. This will help keep semen from spilling out of the condom.  Do not have sex with anyone who has symptoms of an STI, such as sores on the genitals or mouth.  Do not drink a lot of alcohol or use drugs before sex.  Limit your sex partners. Sex with one partner who has sex only with you can reduce your risk of getting an STI.  Don't share sex toys. But if you do share them, use a condom and clean  "the sex toys between each use.  Talk to any partners before you have sex. Talk about what you feel comfortable with and whether you have any boundaries with sex. And find out if your partner or partners may be at risk for any STI. Keep in mind that a person may be able to spread an STI even if they do not have symptoms. You and any partners may want to get tested for STIs.  Where can you learn more?  Go to https://www.[employer].net/patiented  Enter B608 in the search box to learn more about \"Safer Sex: Care Instructions.\"  Current as of: April 30, 2024  Content Version: 14.4    5237-2301 [employer].   Care instructions adapted under license by your healthcare professional. If you have questions about a medical condition or this instruction, always ask your healthcare professional. [employer] disclaims any warranty or liability for your use of this information.       "

## 2025-04-21 NOTE — NURSING NOTE
Prior to immunization administration, verified patients identity using patient s name and date of birth. Please see Immunization Activity for additional information.     Screening Questionnaire for Adult Immunization    Are you sick today?   No   Do you have allergies to medications, food, a vaccine component or latex?   Yes   Have you ever had a serious reaction after receiving a vaccination?   No   Do you have a long-term health problem with heart, lung, kidney, or metabolic disease (e.g., diabetes), asthma, a blood disorder, no spleen, complement component deficiency, a cochlear implant, or a spinal fluid leak?  Are you on long-term aspirin therapy?   No   Do you have cancer, leukemia, HIV/AIDS, or any other immune system problem?   No   Do you have a parent, brother, or sister with an immune system problem?   No   In the past 3 months, have you taken medications that affect  your immune system, such as prednisone, other steroids, or anticancer drugs; drugs for the treatment of rheumatoid arthritis, Crohn s disease, or psoriasis; or have you had radiation treatments?   No   Have you had a seizure, or a brain or other nervous system problem?   No   During the past year, have you received a transfusion of blood or blood    products, or been given immune (gamma) globulin or antiviral drug?   No   For women: Are you pregnant or is there a chance you could become       pregnant during the next month?   No   Have you received any vaccinations in the past 4 weeks?   No     Immunization questionnaire was positive for at least one answer.  Notified Geraldine COLLADO      Patient instructed to remain in clinic for 15 minutes afterwards, and to report any adverse reactions.     Screening performed by Raul Mayen MA on 4/21/2025 at 2:57 PM.

## 2025-04-21 NOTE — PROGRESS NOTES
"Preventive Care Visit  [facility]  Geraldine Good PA-C, Physician Assistant - Medical  Apr 21, 2025      Assessment & Plan     Routine general medical examination at a health care facility  - Adult Dermatology  Referral; Future  - CBC with platelets; Future  - Basic metabolic panel; Future  - Lipid panel reflex to direct LDL Non-fasting; Future  - CBC with platelets  - Basic metabolic panel  - Lipid panel reflex to direct LDL Non-fasting    Exposure to sexually transmitted infection - will complete testing and treat appropriately.  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 Follow up yearly or sooner with issues.  - ARIPiprazole (ABILIFY) 2 MG tablet; Take 1 tablet (2 mg) by mouth daily.  - FLUoxetine (PROZAC) 10 MG capsule; Take 1 capsule (10 mg) by mouth daily. Take together with 20mg capsule for total daily dose of 30mg  - FLUoxetine (PROZAC) 20 MG capsule; Take 1 capsule (20 mg) by mouth daily. Take together with 10mg capsule for total daily dose of 30mg    Hypercholesterolemia - recheck today    Encounter for screening mammogram for breast cancer  - MA Screening Bilateral w/ Eleazar; Future    Encounter for immunization  - TDAP 10-64Y (ADACEL,BOOSTRIX)      BMI  Estimated body mass index is 29.81 kg/m  as calculated from the following:    Height as of this encounter: 1.588 m (5' 2.5\").    Weight as of this encounter: 75.1 kg (165 lb 9.6 oz).       Counseling  Appropriate preventive services were addressed with " this patient via screening, questionnaire, or discussion as appropriate for fall prevention, nutrition, physical activity, Tobacco-use cessation, social engagement, weight loss and cognition.  Checklist reviewing preventive services available has been given to the patient.  Reviewed patient's diet, addressing concerns and/or questions.   The patient was instructed to see the dentist every 6 months.         Wilbur Nur is a 44 year old, presenting for the following:  Physical (Pt mention that she want to get an STI check out and also stated that there was no symptoms. )        4/21/2025     2:26 PM   Additional Questions   Roomed by Dona WILSON MA   Accompanied by Self         4/21/2025     2:26 PM   Patient Reported Additional Medications   Patient reports taking the following new medications Manisha Nur here today for [facility] visit    Partner told her that he has an STI - didn't ask which one  Asymptomatic    Maintains her sobriety - stopped naltrexone when she was pregnant    Via the Health Maintenance questionnaire, the patient has reported the following services have been completed -Colonscopy: [employer] 2022-01-01, this information has been sent to the abstraction team.    HPI  Advance Care Planning    Discussed advance care planning with patient; informed AVS has link to [employer].        4/16/2025   General Health   How would you rate your overall physical health? Good   Feel stress (tense, anxious, or unable to sleep) Only a little   (!) STRESS CONCERN      4/16/2025   Nutrition   Three or more servings of calcium each day? Yes   Diet: Vegetarian/vegan    Gluten-free/reduced   How many servings of fruit and vegetables per day? 4 or more   How many sweetened beverages each day? (!) 2       Multiple values from one day are sorted in reverse-chronological order         4/16/2025   Exercise   Days per week of moderate/strenous exercise 7 days   Average minutes spent exercising at this level 60  min         2025   Social Factors   Frequency of gathering with friends or relatives Twice a week   Worry food won't last until get money to buy more Patient declined   Food not last or not have enough money for food? Patient declined   Do you have housing? (Housing is defined as stable permanent housing and does not include staying ouside in a car, in a tent, in an abandoned building, in an overnight shelter, or couch-surfing.) Yes   Are you worried about losing your housing? Patient declined   Lack of transportation? Patient declined   Unable to get utilities (heat,electricity)? Patient declined         2025   Dental   Dentist two times every year? (!) NO       Today's PHQ-9 Score:       2025     2:25 PM   PHQ-9 SCORE   PHQ-9 Total Score MyChart 1 (Minimal depression)   PHQ-9 Total Score 1        Patient-reported         2025   Substance Use   Alcohol more than 3/day or more than 7/wk Not Applicable   Do you use any other substances recreationally? No     Social History     Tobacco Use    Smoking status: Former     Current packs/day: 0.00     Average packs/day: 0.5 packs/day for 16.3 years (8.1 ttl pk-yrs)     Types: Cigarettes     Start date: 1996     Quit date: 2012     Years since quittin.4    Smokeless tobacco: Never   Vaping Use    Vaping status: Never Used   Substance Use Topics    Alcohol use: Not Currently     Comment: in recovery since 2023    Drug use: Not Currently     Types: Marijuana              2025   STI Screening   New sexual partner(s) since last STI/HIV test? (!) YES      History of abnormal Pap smear: YES - reflected in Problem List and Health Maintenance accordingly        3/2/2021     4:26 PM   PAP / HPV   PAP-ABSTRACT See Scanned Document           This result is from an external source.     ASCVD Risk   The 10-year ASCVD risk score (Mary YANEZ, et al., 2019) is: 0.7%    Values used to calculate the score:      Age: 44 years      Sex:  "Female      Is Non- : No      Diabetic: No      Tobacco smoker: No      Systolic Blood Pressure: 120 mmHg      Is BP treated: No      HDL Cholesterol: 64 mg/dL      Total Cholesterol: 233 mg/dL        4/16/2025   Contraception/Family Planning   Questions about contraception or family planning No        Reviewed and updated as needed this visit by Provider    Allergies  Meds  Problems                  Objective    Exam  /64 (BP Location: Right arm, Patient Position: Sitting, Cuff Size: Adult Regular)   Pulse 74   Temp 97.4  F (36.3  C) (Temporal)   Resp 20   Ht 1.588 m (5' 2.5\")   Wt 75.1 kg (165 lb 9.6 oz)   LMP 04/15/2025 (Approximate)   SpO2 97%   Breastfeeding No   BMI 29.81 kg/m     Estimated body mass index is 29.81 kg/m  as calculated from the following:    Height as of this encounter: 1.588 m (5' 2.5\").    Weight as of this encounter: 75.1 kg (165 lb 9.6 oz).    Physical Exam  GENERAL: alert and no distress  EYES: Eyes grossly normal to inspection, PERRL and conjunctivae and sclerae normal  HENT: ear canals and TM's normal, nose and mouth without ulcers or lesions  NECK: no adenopathy, no asymmetry, masses, or scars  RESP: lungs clear to auscultation - no rales, rhonchi or wheezes  CV: regular rate and rhythm, normal S1 S2, no S3 or S4, no murmur, click or rub, no peripheral edema  ABDOMEN: soft, nontender, no hepatosplenomegaly, no masses and bowel sounds normal  MS: no gross musculoskeletal defects noted, no edema  SKIN: no suspicious lesions or rashes  NEURO: Normal strength and tone, mentation intact and speech normal  PSYCH: mentation appears normal, affect normal/bright        Signed Electronically by: Geraldine Good PA-C    Answers submitted by the patient for this visit:  Patient Health Questionnaire (Submitted on 4/21/2025)  If you checked off any problems, how difficult have these problems made it for you to do your work, take care of things at home, or get " along with other people?: Not difficult at all  PHQ9 TOTAL SCORE: 1  Patient Health Questionnaire (G7) (Submitted on 4/21/2025)  FAIZAN 7 TOTAL SCORE: 4

## 2025-04-21 NOTE — Clinical Note
Please abstract the following data from this visit with this patient into the appropriate field in Epic:  Tests that can be patient reported without a hard copy:  Colonoscopy done on this date: 8/9/21 (approximately), by this group: [facility], results were 7 year plan.

## 2025-04-22 ENCOUNTER — PATIENT OUTREACH (OUTPATIENT)
Dept: CARE COORDINATION | Facility: CLINIC | Age: 45
End: 2025-04-22
Payer: COMMERCIAL

## 2025-04-22 LAB
ANION GAP SERPL CALCULATED.3IONS-SCNC: 12 MMOL/L (ref 7–15)
BUN SERPL-MCNC: 10.3 MG/DL (ref 6–20)
C TRACH DNA SPEC QL PROBE+SIG AMP: NEGATIVE
CALCIUM SERPL-MCNC: 9.2 MG/DL (ref 8.8–10.4)
CHLORIDE SERPL-SCNC: 101 MMOL/L (ref 98–107)
CHOLEST SERPL-MCNC: 266 MG/DL
CREAT SERPL-MCNC: 0.66 MG/DL (ref 0.51–0.95)
EGFRCR SERPLBLD CKD-EPI 2021: >90 ML/MIN/1.73M2
FASTING STATUS PATIENT QL REPORTED: NO
FASTING STATUS PATIENT QL REPORTED: NO
GLUCOSE SERPL-MCNC: 89 MG/DL (ref 70–99)
HCO3 SERPL-SCNC: 24 MMOL/L (ref 22–29)
HDLC SERPL-MCNC: 56 MG/DL
HIV 1+2 AB+HIV1 P24 AG SERPL QL IA: NONREACTIVE
LDLC SERPL CALC-MCNC: 180 MG/DL
N GONORRHOEA DNA SPEC QL NAA+PROBE: NEGATIVE
NONHDLC SERPL-MCNC: 210 MG/DL
POTASSIUM SERPL-SCNC: 3.9 MMOL/L (ref 3.4–5.3)
SODIUM SERPL-SCNC: 137 MMOL/L (ref 135–145)
SPECIMEN TYPE: NORMAL
T PALLIDUM AB SER QL: NONREACTIVE
TRIGL SERPL-MCNC: 151 MG/DL

## 2025-04-22 RX ORDER — METRONIDAZOLE 500 MG/1
500 TABLET ORAL 2 TIMES DAILY
Qty: 14 TABLET | Refills: 0 | Status: SHIPPED | OUTPATIENT
Start: 2025-04-22 | End: 2025-04-29

## 2025-04-22 NOTE — RESULT ENCOUNTER NOTE
Dear Boom,    Your blood cell counts, electrolytes, kidney function and blood sugar were normal.    Your cholesterol levels came back high. I recommend working on dietary changes (reducing saturated fats, increasing vegetables and fruits) and increasing exercise.    Your STI test results were negative (chlamydia, gonorrhea, syphilis, and HIV).  Your vaginal discharge sample did come back for bacterial vaginosis - we should get you treated for this, but I don't think this is the STI your partner had. (Treatment sent to [facility] - metronidazole twice a day for 7 days) Let me know what he tested positive for and we can decide whether to treat you presumptively despite negative testing yesterday.    Take care,  Geraldine Good PA-C